# Patient Record
Sex: FEMALE | Race: WHITE | NOT HISPANIC OR LATINO | Employment: FULL TIME | URBAN - METROPOLITAN AREA
[De-identification: names, ages, dates, MRNs, and addresses within clinical notes are randomized per-mention and may not be internally consistent; named-entity substitution may affect disease eponyms.]

---

## 2017-01-24 ENCOUNTER — HISTORICAL (OUTPATIENT)
Dept: ADMINISTRATIVE | Facility: HOSPITAL | Age: 47
End: 2017-01-24

## 2019-03-11 ENCOUNTER — PATIENT MESSAGE (OUTPATIENT)
Dept: ADMINISTRATIVE | Facility: OTHER | Age: 49
End: 2019-03-11

## 2020-11-09 DIAGNOSIS — M25.562 LEFT KNEE PAIN, UNSPECIFIED CHRONICITY: Primary | ICD-10-CM

## 2021-05-06 ENCOUNTER — PATIENT MESSAGE (OUTPATIENT)
Dept: RESEARCH | Facility: HOSPITAL | Age: 51
End: 2021-05-06

## 2022-01-04 ENCOUNTER — TELEPHONE (OUTPATIENT)
Dept: FAMILY MEDICINE | Facility: CLINIC | Age: 52
End: 2022-01-04
Payer: COMMERCIAL

## 2022-01-31 ENCOUNTER — OFFICE VISIT (OUTPATIENT)
Dept: FAMILY MEDICINE | Facility: CLINIC | Age: 52
End: 2022-01-31
Payer: COMMERCIAL

## 2022-01-31 VITALS
HEART RATE: 95 BPM | HEIGHT: 67 IN | BODY MASS INDEX: 34.21 KG/M2 | DIASTOLIC BLOOD PRESSURE: 98 MMHG | SYSTOLIC BLOOD PRESSURE: 156 MMHG | WEIGHT: 218 LBS | OXYGEN SATURATION: 98 %

## 2022-01-31 DIAGNOSIS — F17.210 CIGARETTE NICOTINE DEPENDENCE WITHOUT COMPLICATION: ICD-10-CM

## 2022-01-31 DIAGNOSIS — Z13.0 SCREENING FOR DEFICIENCY ANEMIA: ICD-10-CM

## 2022-01-31 DIAGNOSIS — R03.0 ELEVATED BLOOD PRESSURE READING: ICD-10-CM

## 2022-01-31 DIAGNOSIS — Z01.419 WELL WOMAN EXAM WITH ROUTINE GYNECOLOGICAL EXAM: Primary | ICD-10-CM

## 2022-01-31 DIAGNOSIS — Z11.59 ENCOUNTER FOR HEPATITIS C SCREENING TEST FOR LOW RISK PATIENT: ICD-10-CM

## 2022-01-31 DIAGNOSIS — Z13.29 SCREENING FOR ENDOCRINE DISORDER: ICD-10-CM

## 2022-01-31 DIAGNOSIS — Z12.31 ENCOUNTER FOR SCREENING MAMMOGRAM FOR MALIGNANT NEOPLASM OF BREAST: ICD-10-CM

## 2022-01-31 DIAGNOSIS — Z13.6 SCREENING FOR ISCHEMIC HEART DISEASE (IHD): ICD-10-CM

## 2022-01-31 PROCEDURE — 3008F PR BODY MASS INDEX (BMI) DOCUMENTED: ICD-10-PCS | Mod: S$GLB,,, | Performed by: FAMILY MEDICINE

## 2022-01-31 PROCEDURE — 3077F SYST BP >= 140 MM HG: CPT | Mod: S$GLB,,, | Performed by: FAMILY MEDICINE

## 2022-01-31 PROCEDURE — 99396 PR PREVENTIVE VISIT,EST,40-64: ICD-10-PCS | Mod: S$GLB,,, | Performed by: FAMILY MEDICINE

## 2022-01-31 PROCEDURE — 3077F PR MOST RECENT SYSTOLIC BLOOD PRESSURE >= 140 MM HG: ICD-10-PCS | Mod: S$GLB,,, | Performed by: FAMILY MEDICINE

## 2022-01-31 PROCEDURE — 99396 PREV VISIT EST AGE 40-64: CPT | Mod: S$GLB,,, | Performed by: FAMILY MEDICINE

## 2022-01-31 PROCEDURE — 3080F PR MOST RECENT DIASTOLIC BLOOD PRESSURE >= 90 MM HG: ICD-10-PCS | Mod: S$GLB,,, | Performed by: FAMILY MEDICINE

## 2022-01-31 PROCEDURE — 3080F DIAST BP >= 90 MM HG: CPT | Mod: S$GLB,,, | Performed by: FAMILY MEDICINE

## 2022-01-31 PROCEDURE — 3008F BODY MASS INDEX DOCD: CPT | Mod: S$GLB,,, | Performed by: FAMILY MEDICINE

## 2022-01-31 RX ORDER — CRANBERRY FRUIT EXTRACT 650 MG
CAPSULE ORAL
COMMUNITY
End: 2022-09-08

## 2022-01-31 RX ORDER — VARENICLINE TARTRATE 1 MG/1
1 TABLET, FILM COATED ORAL 2 TIMES DAILY
Qty: 60 TABLET | Refills: 5 | Status: SHIPPED | OUTPATIENT
Start: 2022-01-31 | End: 2022-09-13

## 2022-01-31 NOTE — PROGRESS NOTES
SUBJECTIVE:   HPI: Nohelia Turk  is a 51 y.o. female who presents for annual physical .   Establish Care, physical to donate kidney, and requests rx for anxiety    Patient past medical history significant for nicotine addiction presents for annual physical.  Last well-woman visit was in 2019. She also completed her colonoscopy at that time.  Mammogram is due.  She does continue to smoke at least half pack a day and requests a prescription for Chantix to help with discontinuation.  She has plans to donate a kidney to her mother-in-law.  She is doing with it takes to make sure that she is adequately healthy enough for donation.  She has received her COVID-19 vaccination.  She has no previous surgical history.  She does have a family history of hypertension and heart disease.  The patient has a menopausal  but no history of abnormal Pap smear.    (Not in a hospital admission)    Review of patient's allergies indicates:  No Known Allergies  Current Outpatient Medications on File Prior to Visit   Medication Sig Dispense Refill    prasterone, dhea, 25 mg Cap Take by mouth.      [DISCONTINUED] FINACEA 15 % cream   5    [DISCONTINUED] JUBLIA 10 % Cherelle   3    [DISCONTINUED] MELATONIN ORAL Take 6 mg by mouth.      [DISCONTINUED] SUBOXONE 8-2 mg Film   0     No current facility-administered medications on file prior to visit.     History reviewed. No pertinent past medical history.  History reviewed. No pertinent surgical history.  Family History   Problem Relation Age of Onset    Hypertension Mother     Heart disease Mother 74        CHF    Osteoporosis Mother     Cancer Father 73        colon & prostate    Diabetes Father     Hypertension Father      Social History     Tobacco Use    Smoking status: Current Every Day Smoker     Packs/day: 0.50     Types: Cigarettes     Last attempt to quit: 2013     Years since quittin.0    Smokeless tobacco: Never Used   Substance Use Topics    Alcohol  "use: Yes     Comment: periodically at dinner    Drug use: Never      Health Maintenance Topics with due status: Not Due       Topic Last Completion Date    Colorectal Cancer Screening 05/06/2019    Lipid Panel 05/13/2019     Immunization History   Administered Date(s) Administered    COVID-19, vector-nr, rS-Ad26, PF (Dorina) 04/05/2021       Review of Systems   Constitutional: Negative for activity change, fatigue and unexpected weight change.   HENT: Negative for hearing loss, postnasal drip, sinus pressure, sore throat and voice change.    Eyes: Negative for photophobia and visual disturbance.   Respiratory: Negative for cough, shortness of breath and wheezing.    Cardiovascular: Negative for chest pain and palpitations.   Gastrointestinal: Negative for constipation, diarrhea and nausea.   Genitourinary: Negative for difficulty urinating, frequency, hematuria and urgency.   Musculoskeletal: Negative for arthralgias and back pain.   Skin: Negative for rash.   Neurological: Negative for weakness, light-headedness and headaches.   Hematological: Negative for adenopathy. Does not bruise/bleed easily.   Psychiatric/Behavioral: The patient is not nervous/anxious.       OBJECTIVE:      Vitals:    01/31/22 1512 01/31/22 1605   BP: (!) 132/104 (!) 156/98   Pulse: 95    SpO2: 98%    Weight: 98.9 kg (218 lb)    Height: 5' 6.5" (1.689 m)      Physical Exam  Vitals reviewed.   Constitutional:       General: She is not in acute distress.     Appearance: Normal appearance. She is well-developed.   HENT:      Head: Normocephalic and atraumatic.      Right Ear: External ear normal.      Left Ear: External ear normal.      Nose: Nose normal.      Mouth/Throat:      Mouth: Mucous membranes are moist.   Eyes:      General: Lids are normal.      Conjunctiva/sclera: Conjunctivae normal.      Pupils: Pupils are equal, round, and reactive to light.   Neck:      Thyroid: No thyromegaly.      Vascular: No JVD.      Trachea: No tracheal " deviation.   Cardiovascular:      Rate and Rhythm: Normal rate and regular rhythm.      Chest Wall: PMI is not displaced.      Pulses: Normal pulses.      Heart sounds: Normal heart sounds.   Pulmonary:      Effort: Pulmonary effort is normal.      Breath sounds: Normal breath sounds.   Abdominal:      General: Bowel sounds are normal.      Palpations: Abdomen is soft.      Tenderness: There is no abdominal tenderness. There is no guarding or rebound.   Genitourinary:     General: Normal vulva.      Vagina: Normal.      Cervix: Normal.      Uterus: Normal.       Adnexa: Right adnexa normal and left adnexa normal.      Rectum: Normal.   Musculoskeletal:         General: No tenderness. Normal range of motion.      Cervical back: Full passive range of motion without pain and neck supple.   Skin:     General: Skin is warm and dry.      Findings: No rash.   Neurological:      General: No focal deficit present.      Mental Status: She is alert and oriented to person, place, and time.   Psychiatric:         Mood and Affect: Mood normal.         Behavior: Behavior normal.        Assessment:       1. Well woman exam with routine gynecological exam    2. Cigarette nicotine dependence without complication    3. Elevated blood pressure reading    4. Encounter for screening mammogram for malignant neoplasm of breast    5. Encounter for hepatitis C screening test for low risk patient    6. Screening for deficiency anemia    7. Screening for endocrine disorder    8. Screening for ischemic heart disease (IHD)        Plan:       Well woman exam with routine gynecological exam  -     CBC Auto Differential; Future; Expected date: 01/31/2022  -     Lipid Panel; Future; Expected date: 01/31/2022  -     Microalbumin/Creatinine Ratio, Urine; Future; Expected date: 01/31/2022  -     Urinalysis; Future; Expected date: 01/31/2022  -     Comprehensive Metabolic Panel; Future; Expected date: 01/31/2022  -     TSH w/reflex to FT4; Future;  Expected date: 01/31/2022  -     ThinPrep Img Pap/HPV mRNA E6/E7 Ch, Gono  -     Hepatitis C Antibody; Future; Expected date: 01/31/2022    Cigarette nicotine dependence without complication  -     varenicline (CHANTIX) 1 mg Tab; Take 1 tablet (1 mg total) by mouth 2 (two) times daily.  Dispense: 60 tablet; Refill: 5    Elevated blood pressure reading    Encounter for screening mammogram for malignant neoplasm of breast  -     Mammo Digital Screening Bilat; Future; Expected date: 01/31/2022    Encounter for hepatitis C screening test for low risk patient  -     Hepatitis C Antibody; Future; Expected date: 01/31/2022    Screening for deficiency anemia  -     CBC Auto Differential; Future; Expected date: 01/31/2022    Screening for endocrine disorder  -     TSH w/reflex to FT4; Future; Expected date: 01/31/2022    Screening for ischemic heart disease (IHD)  -     Lipid Panel; Future; Expected date: 01/31/2022        Counseled on age and gender appropriate medical preventative services, including cancer screenings, immunizations, overall nutritional health, need for a consistent exercise regimen and an overall push towards maintaining a vigorous and active lifestyle.      Follow up in about 1 year (around 1/31/2023) for Annual Physical.

## 2022-02-02 LAB
ALBUMIN SERPL-MCNC: 4.3 G/DL (ref 3.6–5.1)
ALBUMIN/CREAT UR: 15 MCG/MG CREAT
ALBUMIN/GLOB SERPL: 1.5 (CALC) (ref 1–2.5)
ALP SERPL-CCNC: 69 U/L (ref 37–153)
ALT SERPL-CCNC: 18 U/L (ref 6–29)
APPEARANCE UR: CLEAR
AST SERPL-CCNC: 17 U/L (ref 10–35)
BASOPHILS # BLD AUTO: 69 CELLS/UL (ref 0–200)
BASOPHILS NFR BLD AUTO: 0.9 %
BILIRUB SERPL-MCNC: 0.5 MG/DL (ref 0.2–1.2)
BILIRUB UR QL STRIP: NEGATIVE
BUN SERPL-MCNC: 13 MG/DL (ref 7–25)
BUN/CREAT SERPL: ABNORMAL (CALC) (ref 6–22)
C TRACH RRNA SPEC QL NAA+PROBE: NOT DETECTED
CALCIUM SERPL-MCNC: 9.5 MG/DL (ref 8.6–10.4)
CHLORIDE SERPL-SCNC: 100 MMOL/L (ref 98–110)
CHOLEST SERPL-MCNC: 181 MG/DL
CHOLEST/HDLC SERPL: 3.9 (CALC)
CLINICAL INFO: NORMAL
CO2 SERPL-SCNC: 31 MMOL/L (ref 20–32)
COLOR UR: YELLOW
COMMENT: NORMAL
COMMENT: NORMAL
CREAT SERPL-MCNC: 0.8 MG/DL (ref 0.5–1.05)
CREAT UR-MCNC: 186 MG/DL (ref 20–275)
CYTO CVX: NORMAL
CYTOLOGIST CVX/VAG CYTO: NORMAL
CYTOLOGY CMNT CVX/VAG CYTO-IMP: NORMAL
DATE OF PREVIOUS PAP: NORMAL
DATE PREVIOUS BX: NORMAL
EOSINOPHIL # BLD AUTO: 100 CELLS/UL (ref 15–500)
EOSINOPHIL NFR BLD AUTO: 1.3 %
ERYTHROCYTE [DISTWIDTH] IN BLOOD BY AUTOMATED COUNT: 13 % (ref 11–15)
GLOBULIN SER CALC-MCNC: 2.8 G/DL (CALC) (ref 1.9–3.7)
GLUCOSE SERPL-MCNC: 100 MG/DL (ref 65–99)
GLUCOSE UR QL STRIP: NEGATIVE
HCT VFR BLD AUTO: 48.5 % (ref 35–45)
HCV AB S/CO SERPL IA: 0.03
HCV AB SERPL QL IA: NORMAL
HDLC SERPL-MCNC: 47 MG/DL
HGB BLD-MCNC: 15.6 G/DL (ref 11.7–15.5)
HGB UR QL STRIP: NEGATIVE
HPV E6+E7 MRNA CVX QL NAA+PROBE: NOT DETECTED
KETONES UR QL STRIP: NEGATIVE
LDLC SERPL CALC-MCNC: 111 MG/DL (CALC)
LEUKOCYTE ESTERASE UR QL STRIP: NEGATIVE
LMP START DATE: NORMAL
LYMPHOCYTES # BLD AUTO: 2356 CELLS/UL (ref 850–3900)
LYMPHOCYTES NFR BLD AUTO: 30.6 %
MCH RBC QN AUTO: 31.1 PG (ref 27–33)
MCHC RBC AUTO-ENTMCNC: 32.2 G/DL (ref 32–36)
MCV RBC AUTO: 96.8 FL (ref 80–100)
MICROALBUMIN UR-MCNC: 2.7 MG/DL
MONOCYTES # BLD AUTO: 939 CELLS/UL (ref 200–950)
MONOCYTES NFR BLD AUTO: 12.2 %
N GONORRHOEA RRNA SPEC QL NAA+PROBE: NOT DETECTED
NEUTROPHILS # BLD AUTO: 4235 CELLS/UL (ref 1500–7800)
NEUTROPHILS NFR BLD AUTO: 55 %
NITRITE UR QL STRIP: NEGATIVE
NONHDLC SERPL-MCNC: 134 MG/DL (CALC)
PH UR STRIP: 7 [PH] (ref 5–8)
PLATELET # BLD AUTO: 301 THOUSAND/UL (ref 140–400)
PMV BLD REES-ECKER: 11.2 FL (ref 7.5–12.5)
POTASSIUM SERPL-SCNC: 4.4 MMOL/L (ref 3.5–5.3)
PROT SERPL-MCNC: 7.1 G/DL (ref 6.1–8.1)
PROT UR QL STRIP: ABNORMAL
RBC # BLD AUTO: 5.01 MILLION/UL (ref 3.8–5.1)
SODIUM SERPL-SCNC: 139 MMOL/L (ref 135–146)
SP GR UR STRIP: 1.02 (ref 1–1.03)
SPECIMEN SOURCE CVX/VAG CYTO: NORMAL
STAT OF ADQ CVX/VAG CYTO-IMP: NORMAL
TRIGL SERPL-MCNC: 124 MG/DL
TSH SERPL-ACNC: 1.97 MIU/L
WBC # BLD AUTO: 7.7 THOUSAND/UL (ref 3.8–10.8)

## 2022-02-16 ENCOUNTER — HOSPITAL ENCOUNTER (OUTPATIENT)
Dept: RADIOLOGY | Facility: HOSPITAL | Age: 52
Discharge: HOME | End: 2022-02-16
Attending: FAMILY MEDICINE
Payer: COMMERCIAL

## 2022-02-16 VITALS — HEIGHT: 67 IN | WEIGHT: 218.06 LBS | BODY MASS INDEX: 34.22 KG/M2

## 2022-02-16 DIAGNOSIS — Z12.31 ENCOUNTER FOR SCREENING MAMMOGRAM FOR MALIGNANT NEOPLASM OF BREAST: ICD-10-CM

## 2022-02-16 PROCEDURE — 77063 BREAST TOMOSYNTHESIS BI: CPT | Mod: TC,PO

## 2022-03-05 ENCOUNTER — PATIENT MESSAGE (OUTPATIENT)
Dept: FAMILY MEDICINE | Facility: CLINIC | Age: 52
End: 2022-03-05
Payer: COMMERCIAL

## 2022-03-07 RX ORDER — EFINACONAZOLE 100 MG/ML
SOLUTION TOPICAL
Refills: 3 | Status: CANCELLED | OUTPATIENT
Start: 2022-03-07

## 2022-06-20 ENCOUNTER — OFFICE VISIT (OUTPATIENT)
Dept: FAMILY MEDICINE | Facility: CLINIC | Age: 52
End: 2022-06-20
Payer: COMMERCIAL

## 2022-06-20 VITALS
HEART RATE: 88 BPM | WEIGHT: 214 LBS | SYSTOLIC BLOOD PRESSURE: 162 MMHG | OXYGEN SATURATION: 96 % | DIASTOLIC BLOOD PRESSURE: 102 MMHG | HEIGHT: 67 IN | BODY MASS INDEX: 33.59 KG/M2

## 2022-06-20 DIAGNOSIS — F17.210 CIGARETTE NICOTINE DEPENDENCE WITHOUT COMPLICATION: ICD-10-CM

## 2022-06-20 DIAGNOSIS — M70.72 BURSITIS OF OTHER BURSA OF LEFT HIP: ICD-10-CM

## 2022-06-20 DIAGNOSIS — N95.1 MENOPAUSAL SYMPTOMS: Primary | ICD-10-CM

## 2022-06-20 DIAGNOSIS — F43.21 SITUATIONAL DEPRESSION: ICD-10-CM

## 2022-06-20 DIAGNOSIS — R03.0 TRANSIENT ELEVATED BLOOD PRESSURE: ICD-10-CM

## 2022-06-20 PROCEDURE — 3077F SYST BP >= 140 MM HG: CPT | Mod: CPTII,S$GLB,, | Performed by: FAMILY MEDICINE

## 2022-06-20 PROCEDURE — 3008F BODY MASS INDEX DOCD: CPT | Mod: CPTII,S$GLB,, | Performed by: FAMILY MEDICINE

## 2022-06-20 PROCEDURE — 3080F PR MOST RECENT DIASTOLIC BLOOD PRESSURE >= 90 MM HG: ICD-10-PCS | Mod: CPTII,S$GLB,, | Performed by: FAMILY MEDICINE

## 2022-06-20 PROCEDURE — 3080F DIAST BP >= 90 MM HG: CPT | Mod: CPTII,S$GLB,, | Performed by: FAMILY MEDICINE

## 2022-06-20 PROCEDURE — 3077F PR MOST RECENT SYSTOLIC BLOOD PRESSURE >= 140 MM HG: ICD-10-PCS | Mod: CPTII,S$GLB,, | Performed by: FAMILY MEDICINE

## 2022-06-20 PROCEDURE — 3008F PR BODY MASS INDEX (BMI) DOCUMENTED: ICD-10-PCS | Mod: CPTII,S$GLB,, | Performed by: FAMILY MEDICINE

## 2022-06-20 PROCEDURE — 99214 OFFICE O/P EST MOD 30 MIN: CPT | Mod: S$GLB,,, | Performed by: FAMILY MEDICINE

## 2022-06-20 PROCEDURE — 99214 PR OFFICE/OUTPT VISIT, EST, LEVL IV, 30-39 MIN: ICD-10-PCS | Mod: S$GLB,,, | Performed by: FAMILY MEDICINE

## 2022-06-20 RX ORDER — CLONIDINE HYDROCHLORIDE 0.2 MG/1
0.2 TABLET ORAL NIGHTLY
Qty: 90 TABLET | Refills: 1 | Status: SHIPPED | OUTPATIENT
Start: 2022-06-20 | End: 2022-09-07 | Stop reason: SDUPTHER

## 2022-06-20 RX ORDER — VENLAFAXINE HYDROCHLORIDE 75 MG/1
75 CAPSULE, EXTENDED RELEASE ORAL DAILY
Qty: 90 CAPSULE | Refills: 1 | Status: SHIPPED | OUTPATIENT
Start: 2022-06-20 | End: 2022-09-07 | Stop reason: SDUPTHER

## 2022-06-20 NOTE — PROGRESS NOTES
SUBJECTIVE:    Patient ID: Nohelia Turk is a 52 y.o. female.    Chief Complaint: Hip Pain (Left, had a fall x1 mth comes and goes, hot flashes, went over meds verbally// SW)    Patient reports a trip and fall onto her left hip. Has intermittent pain when walking at times and also notes pain when she crosses her legs too long.   Patient has been working on smoking cessation but the stress in her life has been keeping her from completing to quit .   Blood pressure is also noted to be elevated. She attributes this to her stress as well    Notes increase in hot flashes as well and having issues with controlling her weight         History reviewed. No pertinent past medical history.  History reviewed. No pertinent surgical history.  Family History   Problem Relation Age of Onset    Hypertension Mother     Heart disease Mother 74        CHF    Osteoporosis Mother     Cancer Father 73        colon & prostate    Diabetes Father     Hypertension Father        Marital Status:   Alcohol History:  reports current alcohol use.  Tobacco History:  reports that she has been smoking cigarettes. She has been smoking about 0.50 packs per day. She has never used smokeless tobacco.  Drug History:  reports no history of drug use.    Review of patient's allergies indicates:  No Known Allergies    Current Outpatient Medications:     efinaconazole (JUBLIA) 10 % Cherelle, Apply 1 application topically once daily., Disp: 8 mL, Rfl: 1    prasterone, dhea, 25 mg Cap, Take by mouth., Disp: , Rfl:     varenicline (CHANTIX) 1 mg Tab, Take 1 tablet (1 mg total) by mouth 2 (two) times daily., Disp: 60 tablet, Rfl: 5    cloNIDine (CATAPRES) 0.2 MG tablet, Take 1 tablet (0.2 mg total) by mouth nightly. For hot flashes, Disp: 90 tablet, Rfl: 1    venlafaxine (EFFEXOR-XR) 75 MG 24 hr capsule, Take 1 capsule (75 mg total) by mouth once daily., Disp: 90 capsule, Rfl: 1    Review of Systems   All other systems reviewed and are negative.    "    Objective:      Vitals:    06/20/22 1557 06/20/22 1559   BP: (!) 160/100 (!) 162/102   Pulse: 88    SpO2: 96%    Weight: 97.1 kg (214 lb)    Height: 5' 6.5" (1.689 m)      Physical Exam  Constitutional:       Appearance: Normal appearance.   HENT:      Head: Normocephalic and atraumatic.      Mouth/Throat:      Mouth: Mucous membranes are moist.   Eyes:      Conjunctiva/sclera: Conjunctivae normal.   Pulmonary:      Effort: Pulmonary effort is normal.   Neurological:      General: No focal deficit present.      Mental Status: She is alert and oriented to person, place, and time.   Psychiatric:         Mood and Affect: Mood normal.         Behavior: Behavior normal.         Assessment:       1. Menopausal symptoms    2. Situational depression    3. Cigarette nicotine dependence without complication    4. Transient elevated blood pressure    5. Bursitis of other bursa of left hip         Plan:       Menopausal symptoms  -     venlafaxine (EFFEXOR-XR) 75 MG 24 hr capsule; Take 1 capsule (75 mg total) by mouth once daily.  Dispense: 90 capsule; Refill: 1  -     cloNIDine (CATAPRES) 0.2 MG tablet; Take 1 tablet (0.2 mg total) by mouth nightly. For hot flashes  Dispense: 90 tablet; Refill: 1    Situational depression  -     venlafaxine (EFFEXOR-XR) 75 MG 24 hr capsule; Take 1 capsule (75 mg total) by mouth once daily.  Dispense: 90 capsule; Refill: 1    Cigarette nicotine dependence without complication    Transient elevated blood pressure  -     cloNIDine (CATAPRES) 0.2 MG tablet; Take 1 tablet (0.2 mg total) by mouth nightly. For hot flashes  Dispense: 90 tablet; Refill: 1    Bursitis of other bursa of left hip      Follow up 2-3 months, for blood pressure , hot flashes .              "

## 2022-06-22 ENCOUNTER — PATIENT MESSAGE (OUTPATIENT)
Dept: FAMILY MEDICINE | Facility: CLINIC | Age: 52
End: 2022-06-22

## 2022-06-23 RX ORDER — IBUPROFEN 600 MG/1
600 TABLET ORAL 3 TIMES DAILY
Qty: 60 TABLET | Refills: 0 | Status: SHIPPED | OUTPATIENT
Start: 2022-06-23 | End: 2022-11-21

## 2022-08-19 ENCOUNTER — PATIENT MESSAGE (OUTPATIENT)
Dept: FAMILY MEDICINE | Facility: CLINIC | Age: 52
End: 2022-08-19

## 2022-08-24 ENCOUNTER — PATIENT MESSAGE (OUTPATIENT)
Dept: FAMILY MEDICINE | Facility: CLINIC | Age: 52
End: 2022-08-24

## 2022-08-31 DIAGNOSIS — F43.21 SITUATIONAL DEPRESSION: ICD-10-CM

## 2022-08-31 DIAGNOSIS — R03.0 TRANSIENT ELEVATED BLOOD PRESSURE: ICD-10-CM

## 2022-08-31 DIAGNOSIS — N95.1 MENOPAUSAL SYMPTOMS: ICD-10-CM

## 2022-09-08 RX ORDER — VENLAFAXINE HYDROCHLORIDE 75 MG/1
CAPSULE, EXTENDED RELEASE ORAL
Refills: 0 | OUTPATIENT
Start: 2022-09-08

## 2022-09-08 RX ORDER — CLONIDINE HYDROCHLORIDE 0.2 MG/1
TABLET ORAL
Refills: 0 | OUTPATIENT
Start: 2022-09-08

## 2022-09-13 ENCOUNTER — OFFICE VISIT (OUTPATIENT)
Dept: FAMILY MEDICINE | Facility: CLINIC | Age: 52
End: 2022-09-13
Payer: COMMERCIAL

## 2022-09-13 ENCOUNTER — HOSPITAL ENCOUNTER (OUTPATIENT)
Dept: RADIOLOGY | Facility: HOSPITAL | Age: 52
Discharge: HOME | End: 2022-09-13
Attending: FAMILY MEDICINE
Payer: COMMERCIAL

## 2022-09-13 VITALS
BODY MASS INDEX: 33.59 KG/M2 | HEIGHT: 67 IN | DIASTOLIC BLOOD PRESSURE: 110 MMHG | SYSTOLIC BLOOD PRESSURE: 172 MMHG | WEIGHT: 214 LBS | HEART RATE: 110 BPM

## 2022-09-13 DIAGNOSIS — M70.72 BURSITIS OF OTHER BURSA OF LEFT HIP: ICD-10-CM

## 2022-09-13 DIAGNOSIS — F43.21 SITUATIONAL DEPRESSION: ICD-10-CM

## 2022-09-13 DIAGNOSIS — I10 ESSENTIAL HYPERTENSION: Primary | ICD-10-CM

## 2022-09-13 DIAGNOSIS — N95.1 MENOPAUSAL SYMPTOMS: ICD-10-CM

## 2022-09-13 PROCEDURE — 3061F PR NEG MICROALBUMINURIA RESULT DOCUMENTED/REVIEW: ICD-10-PCS | Mod: CPTII,S$GLB,, | Performed by: FAMILY MEDICINE

## 2022-09-13 PROCEDURE — 1159F MED LIST DOCD IN RCRD: CPT | Mod: CPTII,S$GLB,, | Performed by: FAMILY MEDICINE

## 2022-09-13 PROCEDURE — 3080F DIAST BP >= 90 MM HG: CPT | Mod: CPTII,S$GLB,, | Performed by: FAMILY MEDICINE

## 2022-09-13 PROCEDURE — 3077F PR MOST RECENT SYSTOLIC BLOOD PRESSURE >= 140 MM HG: ICD-10-PCS | Mod: CPTII,S$GLB,, | Performed by: FAMILY MEDICINE

## 2022-09-13 PROCEDURE — 1160F PR REVIEW ALL MEDS BY PRESCRIBER/CLIN PHARMACIST DOCUMENTED: ICD-10-PCS | Mod: CPTII,S$GLB,, | Performed by: FAMILY MEDICINE

## 2022-09-13 PROCEDURE — 73502 X-RAY EXAM HIP UNI 2-3 VIEWS: CPT | Mod: TC,PO,LT

## 2022-09-13 PROCEDURE — 3066F PR DOCUMENTATION OF TREATMENT FOR NEPHROPATHY: ICD-10-PCS | Mod: CPTII,S$GLB,, | Performed by: FAMILY MEDICINE

## 2022-09-13 PROCEDURE — 99214 PR OFFICE/OUTPT VISIT, EST, LEVL IV, 30-39 MIN: ICD-10-PCS | Mod: S$GLB,,, | Performed by: FAMILY MEDICINE

## 2022-09-13 PROCEDURE — 3066F NEPHROPATHY DOC TX: CPT | Mod: CPTII,S$GLB,, | Performed by: FAMILY MEDICINE

## 2022-09-13 PROCEDURE — 1160F RVW MEDS BY RX/DR IN RCRD: CPT | Mod: CPTII,S$GLB,, | Performed by: FAMILY MEDICINE

## 2022-09-13 PROCEDURE — 1159F PR MEDICATION LIST DOCUMENTED IN MEDICAL RECORD: ICD-10-PCS | Mod: CPTII,S$GLB,, | Performed by: FAMILY MEDICINE

## 2022-09-13 PROCEDURE — 3077F SYST BP >= 140 MM HG: CPT | Mod: CPTII,S$GLB,, | Performed by: FAMILY MEDICINE

## 2022-09-13 PROCEDURE — 3061F NEG MICROALBUMINURIA REV: CPT | Mod: CPTII,S$GLB,, | Performed by: FAMILY MEDICINE

## 2022-09-13 PROCEDURE — 3080F PR MOST RECENT DIASTOLIC BLOOD PRESSURE >= 90 MM HG: ICD-10-PCS | Mod: CPTII,S$GLB,, | Performed by: FAMILY MEDICINE

## 2022-09-13 PROCEDURE — 99214 OFFICE O/P EST MOD 30 MIN: CPT | Mod: S$GLB,,, | Performed by: FAMILY MEDICINE

## 2022-09-13 RX ORDER — OLMESARTAN MEDOXOMIL AND HYDROCHLOROTHIAZIDE 20/12.5 20; 12.5 MG/1; MG/1
1 TABLET ORAL DAILY
Qty: 90 TABLET | Refills: 1 | Status: SHIPPED | OUTPATIENT
Start: 2022-09-13 | End: 2022-11-21

## 2022-09-13 NOTE — PROGRESS NOTES
SUBJECTIVE:    Patient ID: Nohelia Turk is a 52 y.o. female.    Chief Complaint: Hypertension (Did not bring bottles//)    Patient hypertension presents for her follow-up.  Effexor has helped with her mood and her hot flashes.      Still with some occasional hip pain with certain movements. Nsaids helped     Blood pressure is still significantly elevated despite daily therapy.  She does report a some increased stress.  She denies headache.  .    History reviewed. No pertinent past medical history.  History reviewed. No pertinent surgical history.  Family History   Problem Relation Age of Onset    Hypertension Mother     Heart disease Mother 74        CHF    Osteoporosis Mother     Cancer Father 73        colon & prostate    Diabetes Father     Hypertension Father        Marital Status:   Alcohol History:  reports current alcohol use.  Tobacco History:  reports that she has been smoking cigarettes. She has been smoking an average of .5 packs per day. She has never used smokeless tobacco.  Drug History:  reports no history of drug use.    Review of patient's allergies indicates:  No Known Allergies    Current Outpatient Medications:     cloNIDine (CATAPRES) 0.2 MG tablet, Take 1 tablet (0.2 mg total) by mouth nightly. For hot flashes, Disp: 90 tablet, Rfl: 1    ibuprofen (ADVIL,MOTRIN) 600 MG tablet, Take 1 tablet (600 mg total) by mouth 3 (three) times daily., Disp: 60 tablet, Rfl: 0    venlafaxine (EFFEXOR-XR) 75 MG 24 hr capsule, Take 1 capsule (75 mg total) by mouth once daily., Disp: 90 capsule, Rfl: 1    olmesartan-hydrochlorothiazide (BENICAR HCT) 20-12.5 mg per tablet, Take 1 tablet by mouth once daily., Disp: 90 tablet, Rfl: 1    Review of Systems   Constitutional:  Negative for activity change, fatigue and unexpected weight change.   HENT:  Negative for hearing loss, postnasal drip, sinus pressure, sore throat and voice change.    Eyes:  Negative for photophobia and visual disturbance.  "  Respiratory:  Negative for cough, shortness of breath and wheezing.    Cardiovascular:  Negative for chest pain and palpitations.   Gastrointestinal:  Negative for constipation, diarrhea and nausea.   Genitourinary:  Negative for difficulty urinating, frequency, hematuria and urgency.   Musculoskeletal:  Negative for arthralgias and back pain.   Skin:  Negative for rash.   Neurological:  Negative for weakness, light-headedness and headaches.   Hematological:  Negative for adenopathy. Does not bruise/bleed easily.   Psychiatric/Behavioral:  The patient is not nervous/anxious.         Objective:      Vitals:    09/13/22 1424 09/13/22 1428   BP: (!) 178/118 (!) 172/110   Pulse: 110    Weight: 97.1 kg (214 lb)    Height: 5' 6.5" (1.689 m)      Physical Exam  Constitutional:       Appearance: Normal appearance.   HENT:      Head: Normocephalic and atraumatic.      Mouth/Throat:      Mouth: Mucous membranes are moist.   Eyes:      Conjunctiva/sclera: Conjunctivae normal.   Pulmonary:      Effort: Pulmonary effort is normal.   Neurological:      General: No focal deficit present.      Mental Status: She is alert and oriented to person, place, and time.   Psychiatric:         Mood and Affect: Mood normal.         Behavior: Behavior normal.         Assessment:       1. Essential hypertension    2. Menopausal symptoms    3. Situational depression    4. Bursitis of other bursa of left hip         Plan:       Essential hypertension  -     olmesartan-hydrochlorothiazide (BENICAR HCT) 20-12.5 mg per tablet; Take 1 tablet by mouth once daily.  Dispense: 90 tablet; Refill: 1    Menopausal symptoms    Situational depression    Bursitis of other bursa of left hip  -     X-Ray Hip 2 or 3 views Left (with Pelvis when performed); Future; Expected date: 09/13/2022    Follow up in about 8 weeks (around 11/8/2022) for HTN.                "

## 2022-09-15 ENCOUNTER — PATIENT MESSAGE (OUTPATIENT)
Dept: FAMILY MEDICINE | Facility: CLINIC | Age: 52
End: 2022-09-15

## 2022-09-16 ENCOUNTER — PATIENT MESSAGE (OUTPATIENT)
Dept: FAMILY MEDICINE | Facility: CLINIC | Age: 52
End: 2022-09-16

## 2022-09-19 ENCOUNTER — PATIENT MESSAGE (OUTPATIENT)
Dept: FAMILY MEDICINE | Facility: CLINIC | Age: 52
End: 2022-09-19

## 2022-09-19 RX ORDER — SEMAGLUTIDE 1.34 MG/ML
0.5 INJECTION, SOLUTION SUBCUTANEOUS
Qty: 1 PEN | Refills: 1 | Status: SHIPPED | OUTPATIENT
Start: 2022-09-19 | End: 2022-10-20

## 2022-09-28 ENCOUNTER — PATIENT MESSAGE (OUTPATIENT)
Dept: FAMILY MEDICINE | Facility: CLINIC | Age: 52
End: 2022-09-28

## 2022-10-11 ENCOUNTER — PATIENT MESSAGE (OUTPATIENT)
Dept: FAMILY MEDICINE | Facility: CLINIC | Age: 52
End: 2022-10-11

## 2022-10-20 RX ORDER — SEMAGLUTIDE 0.25 MG/.5ML
0.25 INJECTION, SOLUTION SUBCUTANEOUS
Qty: 2 ML | Refills: 1 | Status: SHIPPED | OUTPATIENT
Start: 2022-10-20 | End: 2022-11-21

## 2022-10-24 ENCOUNTER — PATIENT MESSAGE (OUTPATIENT)
Dept: FAMILY MEDICINE | Facility: CLINIC | Age: 52
End: 2022-10-24

## 2022-10-26 ENCOUNTER — TELEPHONE (OUTPATIENT)
Dept: FAMILY MEDICINE | Facility: CLINIC | Age: 52
End: 2022-10-26

## 2022-10-26 NOTE — TELEPHONE ENCOUNTER
----- Message from Natasha Rodriguez sent at 10/26/2022 11:48 AM CDT -----  PA for Wegovy has been approved for 9/25/22 - 5/23/23. Notified pt. Thank you

## 2022-11-21 ENCOUNTER — OFFICE VISIT (OUTPATIENT)
Dept: FAMILY MEDICINE | Facility: CLINIC | Age: 52
End: 2022-11-21
Payer: COMMERCIAL

## 2022-11-21 VITALS
DIASTOLIC BLOOD PRESSURE: 100 MMHG | BODY MASS INDEX: 33.27 KG/M2 | WEIGHT: 212 LBS | SYSTOLIC BLOOD PRESSURE: 138 MMHG | OXYGEN SATURATION: 99 % | HEART RATE: 110 BPM | HEIGHT: 67 IN

## 2022-11-21 DIAGNOSIS — Z13.6 SCREENING FOR ISCHEMIC HEART DISEASE (IHD): ICD-10-CM

## 2022-11-21 DIAGNOSIS — I10 ESSENTIAL HYPERTENSION: Primary | ICD-10-CM

## 2022-11-21 DIAGNOSIS — F17.210 CIGARETTE NICOTINE DEPENDENCE WITHOUT COMPLICATION: ICD-10-CM

## 2022-11-21 DIAGNOSIS — R79.89 ABNORMAL CBC: ICD-10-CM

## 2022-11-21 DIAGNOSIS — E66.9 CLASS 1 OBESITY WITH SERIOUS COMORBIDITY AND BODY MASS INDEX (BMI) OF 33.0 TO 33.9 IN ADULT, UNSPECIFIED OBESITY TYPE: ICD-10-CM

## 2022-11-21 PROCEDURE — 1159F PR MEDICATION LIST DOCUMENTED IN MEDICAL RECORD: ICD-10-PCS | Mod: CPTII,S$GLB,, | Performed by: FAMILY MEDICINE

## 2022-11-21 PROCEDURE — 1159F MED LIST DOCD IN RCRD: CPT | Mod: CPTII,S$GLB,, | Performed by: FAMILY MEDICINE

## 2022-11-21 PROCEDURE — 99214 PR OFFICE/OUTPT VISIT, EST, LEVL IV, 30-39 MIN: ICD-10-PCS | Mod: S$GLB,,, | Performed by: FAMILY MEDICINE

## 2022-11-21 PROCEDURE — 3080F PR MOST RECENT DIASTOLIC BLOOD PRESSURE >= 90 MM HG: ICD-10-PCS | Mod: CPTII,S$GLB,, | Performed by: FAMILY MEDICINE

## 2022-11-21 PROCEDURE — 3066F NEPHROPATHY DOC TX: CPT | Mod: CPTII,S$GLB,, | Performed by: FAMILY MEDICINE

## 2022-11-21 PROCEDURE — 1160F PR REVIEW ALL MEDS BY PRESCRIBER/CLIN PHARMACIST DOCUMENTED: ICD-10-PCS | Mod: CPTII,S$GLB,, | Performed by: FAMILY MEDICINE

## 2022-11-21 PROCEDURE — 1160F RVW MEDS BY RX/DR IN RCRD: CPT | Mod: CPTII,S$GLB,, | Performed by: FAMILY MEDICINE

## 2022-11-21 PROCEDURE — 3008F BODY MASS INDEX DOCD: CPT | Mod: CPTII,S$GLB,, | Performed by: FAMILY MEDICINE

## 2022-11-21 PROCEDURE — 3061F NEG MICROALBUMINURIA REV: CPT | Mod: CPTII,S$GLB,, | Performed by: FAMILY MEDICINE

## 2022-11-21 PROCEDURE — 3008F PR BODY MASS INDEX (BMI) DOCUMENTED: ICD-10-PCS | Mod: CPTII,S$GLB,, | Performed by: FAMILY MEDICINE

## 2022-11-21 PROCEDURE — 3061F PR NEG MICROALBUMINURIA RESULT DOCUMENTED/REVIEW: ICD-10-PCS | Mod: CPTII,S$GLB,, | Performed by: FAMILY MEDICINE

## 2022-11-21 PROCEDURE — 3075F SYST BP GE 130 - 139MM HG: CPT | Mod: CPTII,S$GLB,, | Performed by: FAMILY MEDICINE

## 2022-11-21 PROCEDURE — 3075F PR MOST RECENT SYSTOLIC BLOOD PRESS GE 130-139MM HG: ICD-10-PCS | Mod: CPTII,S$GLB,, | Performed by: FAMILY MEDICINE

## 2022-11-21 PROCEDURE — 3066F PR DOCUMENTATION OF TREATMENT FOR NEPHROPATHY: ICD-10-PCS | Mod: CPTII,S$GLB,, | Performed by: FAMILY MEDICINE

## 2022-11-21 PROCEDURE — 99214 OFFICE O/P EST MOD 30 MIN: CPT | Mod: S$GLB,,, | Performed by: FAMILY MEDICINE

## 2022-11-21 PROCEDURE — 3080F DIAST BP >= 90 MM HG: CPT | Mod: CPTII,S$GLB,, | Performed by: FAMILY MEDICINE

## 2022-11-21 RX ORDER — AMLODIPINE AND OLMESARTAN MEDOXOMIL 5; 20 MG/1; MG/1
1 TABLET ORAL DAILY
Qty: 90 TABLET | Refills: 3 | Status: SHIPPED | OUTPATIENT
Start: 2022-11-21 | End: 2023-02-16

## 2022-11-21 RX ORDER — SEMAGLUTIDE 0.5 MG/.5ML
0.5 INJECTION, SOLUTION SUBCUTANEOUS
Qty: 2 ML | Refills: 1 | Status: SHIPPED | OUTPATIENT
Start: 2022-11-21 | End: 2023-01-31 | Stop reason: SDUPTHER

## 2022-11-21 NOTE — PROGRESS NOTES
SUBJECTIVE:    Patient ID: Nohelia Turk is a 52 y.o. female.    Chief Complaint: 2M HTN Check Up, wegovy refill, and declined flu vaccination    Patient with newly diagnosed hypertension presents for visit.  She was on Benicar HCTZ and has noted that her blood pressures generally run 140s over 100 similar to today.  Has improved however from previous values.  She is requesting a change of medication however because hydrochlorothiazide is causing significantly dry mouth.    Patient has also been started on wegovy for obesity.  She feels that she is tolerating the low is does well.  Has had 2 lb weight loss and would like to increase to the next dose.    Patient does continue to smoke.  Notably last CBC showed some pancytopenia most likely due to tobacco use.  Recheck as needed.      History reviewed. No pertinent past medical history.  History reviewed. No pertinent surgical history.  Family History   Problem Relation Age of Onset    Hypertension Mother     Heart disease Mother 74        CHF    Osteoporosis Mother     Cancer Father 73        colon & prostate    Diabetes Father     Hypertension Father        Marital Status:   Alcohol History:  reports current alcohol use.  Tobacco History:  reports that she has been smoking cigarettes. She has been smoking an average of .5 packs per day. She has never used smokeless tobacco.  Drug History:  reports no history of drug use.    Review of patient's allergies indicates:  No Known Allergies    Current Outpatient Medications:     cloNIDine (CATAPRES) 0.2 MG tablet, Take 1 tablet (0.2 mg total) by mouth nightly. For hot flashes, Disp: 90 tablet, Rfl: 1    venlafaxine (EFFEXOR-XR) 75 MG 24 hr capsule, Take 1 capsule (75 mg total) by mouth once daily., Disp: 90 capsule, Rfl: 1    amlodipine-olmesartan (JONATHAN) 5-20 mg per tablet, Take 1 tablet by mouth once daily., Disp: 90 tablet, Rfl: 3    semaglutide, weight loss, (WEGOVY) 0.5 mg/0.5 mL PnIj, Inject 0.5 mg into  "the skin every 7 days., Disp: 2 mL, Rfl: 1    Review of Systems   Constitutional:  Negative for activity change, fatigue and unexpected weight change.   HENT:  Negative for hearing loss, postnasal drip, sinus pressure, sore throat and voice change.    Eyes:  Negative for photophobia and visual disturbance.   Respiratory:  Negative for cough, shortness of breath and wheezing.    Cardiovascular:  Negative for chest pain and palpitations.   Gastrointestinal:  Positive for nausea. Negative for constipation and diarrhea.   Genitourinary:  Negative for difficulty urinating, frequency, hematuria and urgency.   Musculoskeletal:  Negative for arthralgias and back pain.   Skin:  Negative for rash.   Neurological:  Negative for weakness, light-headedness and headaches.   Hematological:  Negative for adenopathy. Does not bruise/bleed easily.   Psychiatric/Behavioral:  The patient is not nervous/anxious.    All other systems reviewed and are negative.       Objective:      Vitals:    11/21/22 1506 11/21/22 1530   BP: (!) 144/108 (!) 138/100   Pulse: 110    SpO2: 99%    Weight: 96.2 kg (212 lb)    Height: 5' 6.5" (1.689 m)      Physical Exam  Constitutional:       Appearance: Normal appearance.   HENT:      Head: Normocephalic and atraumatic.      Mouth/Throat:      Mouth: Mucous membranes are moist.   Eyes:      Conjunctiva/sclera: Conjunctivae normal.   Pulmonary:      Effort: Pulmonary effort is normal.   Neurological:      General: No focal deficit present.      Mental Status: She is alert and oriented to person, place, and time.   Psychiatric:         Mood and Affect: Mood normal.         Behavior: Behavior normal.         Assessment:       1. Essential hypertension    2. Class 1 obesity with serious comorbidity and body mass index (BMI) of 33.0 to 33.9 in adult, unspecified obesity type    3. Cigarette nicotine dependence without complication    4. Abnormal CBC    5. Screening for ischemic heart disease (IHD)         Plan: "       Essential hypertension  -     amlodipine-olmesartan (JONATHAN) 5-20 mg per tablet; Take 1 tablet by mouth once daily.  Dispense: 90 tablet; Refill: 3  -     Microalbumin/Creatinine Ratio, Urine; Future; Expected date: 01/25/2023  -     Comprehensive Metabolic Panel; Future; Expected date: 01/25/2023    Class 1 obesity with serious comorbidity and body mass index (BMI) of 33.0 to 33.9 in adult, unspecified obesity type  -     semaglutide, weight loss, (WEGOVY) 0.5 mg/0.5 mL PnIj; Inject 0.5 mg into the skin every 7 days.  Dispense: 2 mL; Refill: 1    Cigarette nicotine dependence without complication    Abnormal CBC  -     CBC Auto Differential; Future; Expected date: 01/25/2023    Screening for ischemic heart disease (IHD)  -     Lipid Panel; Future; Expected date: 01/25/2023    Follow up in about 4 months (around 3/21/2023).

## 2023-01-19 ENCOUNTER — TELEPHONE (OUTPATIENT)
Dept: FAMILY MEDICINE | Facility: CLINIC | Age: 53
End: 2023-01-19

## 2023-01-24 ENCOUNTER — TELEPHONE (OUTPATIENT)
Dept: FAMILY MEDICINE | Facility: CLINIC | Age: 53
End: 2023-01-24

## 2023-01-24 NOTE — TELEPHONE ENCOUNTER
----- Message from Tu Hills MD sent at 11/21/2022  3:39 PM CST -----  Remind patient to get labs prior to upcoming appointment

## 2023-01-24 NOTE — TELEPHONE ENCOUNTER
Left message to notify to complete fasting labs one week prior to visit.  Informed to return call with questions/concerns -DN

## 2023-01-26 LAB
ALBUMIN SERPL-MCNC: 4.2 G/DL (ref 3.6–5.1)
ALBUMIN/CREAT UR: 11 MCG/MG CREAT
ALBUMIN/GLOB SERPL: 1.4 (CALC) (ref 1–2.5)
ALP SERPL-CCNC: 86 U/L (ref 37–153)
ALT SERPL-CCNC: 25 U/L (ref 6–29)
AST SERPL-CCNC: 21 U/L (ref 10–35)
BASOPHILS # BLD AUTO: 72 CELLS/UL (ref 0–200)
BASOPHILS NFR BLD AUTO: 0.8 %
BILIRUB SERPL-MCNC: 0.5 MG/DL (ref 0.2–1.2)
BUN SERPL-MCNC: 17 MG/DL (ref 7–25)
BUN/CREAT SERPL: ABNORMAL (CALC) (ref 6–22)
CALCIUM SERPL-MCNC: 9 MG/DL (ref 8.6–10.4)
CHLORIDE SERPL-SCNC: 99 MMOL/L (ref 98–110)
CHOLEST SERPL-MCNC: 213 MG/DL
CHOLEST/HDLC SERPL: 4 (CALC)
CO2 SERPL-SCNC: 35 MMOL/L (ref 20–32)
CREAT SERPL-MCNC: 0.78 MG/DL (ref 0.5–1.03)
CREAT UR-MCNC: 158 MG/DL (ref 20–275)
EGFR: 90 ML/MIN/1.73M2
EOSINOPHIL # BLD AUTO: 144 CELLS/UL (ref 15–500)
EOSINOPHIL NFR BLD AUTO: 1.6 %
ERYTHROCYTE [DISTWIDTH] IN BLOOD BY AUTOMATED COUNT: 13.2 % (ref 11–15)
GLOBULIN SER CALC-MCNC: 2.9 G/DL (CALC) (ref 1.9–3.7)
GLUCOSE SERPL-MCNC: 116 MG/DL (ref 65–99)
HCT VFR BLD AUTO: 44.7 % (ref 35–45)
HDLC SERPL-MCNC: 53 MG/DL
HGB BLD-MCNC: 15.2 G/DL (ref 11.7–15.5)
LDLC SERPL CALC-MCNC: 138 MG/DL (CALC)
LYMPHOCYTES # BLD AUTO: 2619 CELLS/UL (ref 850–3900)
LYMPHOCYTES NFR BLD AUTO: 29.1 %
MCH RBC QN AUTO: 31.9 PG (ref 27–33)
MCHC RBC AUTO-ENTMCNC: 34 G/DL (ref 32–36)
MCV RBC AUTO: 93.9 FL (ref 80–100)
MICROALBUMIN UR-MCNC: 1.8 MG/DL
MONOCYTES # BLD AUTO: 1206 CELLS/UL (ref 200–950)
MONOCYTES NFR BLD AUTO: 13.4 %
NEUTROPHILS # BLD AUTO: 4959 CELLS/UL (ref 1500–7800)
NEUTROPHILS NFR BLD AUTO: 55.1 %
NONHDLC SERPL-MCNC: 160 MG/DL (CALC)
PLATELET # BLD AUTO: 347 THOUSAND/UL (ref 140–400)
PMV BLD REES-ECKER: 10.6 FL (ref 7.5–12.5)
POTASSIUM SERPL-SCNC: 4.4 MMOL/L (ref 3.5–5.3)
PROT SERPL-MCNC: 7.1 G/DL (ref 6.1–8.1)
RBC # BLD AUTO: 4.76 MILLION/UL (ref 3.8–5.1)
SODIUM SERPL-SCNC: 139 MMOL/L (ref 135–146)
TRIGL SERPL-MCNC: 106 MG/DL
WBC # BLD AUTO: 9 THOUSAND/UL (ref 3.8–10.8)

## 2023-01-31 ENCOUNTER — OFFICE VISIT (OUTPATIENT)
Dept: FAMILY MEDICINE | Facility: CLINIC | Age: 53
End: 2023-01-31
Payer: COMMERCIAL

## 2023-01-31 VITALS
HEART RATE: 94 BPM | HEIGHT: 67 IN | BODY MASS INDEX: 34.63 KG/M2 | WEIGHT: 220.63 LBS | DIASTOLIC BLOOD PRESSURE: 82 MMHG | OXYGEN SATURATION: 97 % | SYSTOLIC BLOOD PRESSURE: 132 MMHG

## 2023-01-31 DIAGNOSIS — N95.1 MENOPAUSAL SYMPTOMS: ICD-10-CM

## 2023-01-31 DIAGNOSIS — E66.9 CLASS 1 OBESITY WITH SERIOUS COMORBIDITY AND BODY MASS INDEX (BMI) OF 33.0 TO 33.9 IN ADULT, UNSPECIFIED OBESITY TYPE: ICD-10-CM

## 2023-01-31 DIAGNOSIS — I10 ESSENTIAL HYPERTENSION: ICD-10-CM

## 2023-01-31 DIAGNOSIS — Z00.00 ANNUAL PHYSICAL EXAM: Primary | ICD-10-CM

## 2023-01-31 PROCEDURE — 3066F NEPHROPATHY DOC TX: CPT | Mod: CPTII,S$GLB,, | Performed by: FAMILY MEDICINE

## 2023-01-31 PROCEDURE — 4010F PR ACE/ARB THEARPY RXD/TAKEN: ICD-10-PCS | Mod: CPTII,S$GLB,, | Performed by: FAMILY MEDICINE

## 2023-01-31 PROCEDURE — 3075F SYST BP GE 130 - 139MM HG: CPT | Mod: CPTII,S$GLB,, | Performed by: FAMILY MEDICINE

## 2023-01-31 PROCEDURE — 99396 PR PREVENTIVE VISIT,EST,40-64: ICD-10-PCS | Mod: S$GLB,,, | Performed by: FAMILY MEDICINE

## 2023-01-31 PROCEDURE — 1159F PR MEDICATION LIST DOCUMENTED IN MEDICAL RECORD: ICD-10-PCS | Mod: CPTII,S$GLB,, | Performed by: FAMILY MEDICINE

## 2023-01-31 PROCEDURE — 3079F DIAST BP 80-89 MM HG: CPT | Mod: CPTII,S$GLB,, | Performed by: FAMILY MEDICINE

## 2023-01-31 PROCEDURE — 3008F BODY MASS INDEX DOCD: CPT | Mod: CPTII,S$GLB,, | Performed by: FAMILY MEDICINE

## 2023-01-31 PROCEDURE — 4010F ACE/ARB THERAPY RXD/TAKEN: CPT | Mod: CPTII,S$GLB,, | Performed by: FAMILY MEDICINE

## 2023-01-31 PROCEDURE — 3075F PR MOST RECENT SYSTOLIC BLOOD PRESS GE 130-139MM HG: ICD-10-PCS | Mod: CPTII,S$GLB,, | Performed by: FAMILY MEDICINE

## 2023-01-31 PROCEDURE — 3079F PR MOST RECENT DIASTOLIC BLOOD PRESSURE 80-89 MM HG: ICD-10-PCS | Mod: CPTII,S$GLB,, | Performed by: FAMILY MEDICINE

## 2023-01-31 PROCEDURE — 1159F MED LIST DOCD IN RCRD: CPT | Mod: CPTII,S$GLB,, | Performed by: FAMILY MEDICINE

## 2023-01-31 PROCEDURE — 3008F PR BODY MASS INDEX (BMI) DOCUMENTED: ICD-10-PCS | Mod: CPTII,S$GLB,, | Performed by: FAMILY MEDICINE

## 2023-01-31 PROCEDURE — 3061F NEG MICROALBUMINURIA REV: CPT | Mod: CPTII,S$GLB,, | Performed by: FAMILY MEDICINE

## 2023-01-31 PROCEDURE — 3061F PR NEG MICROALBUMINURIA RESULT DOCUMENTED/REVIEW: ICD-10-PCS | Mod: CPTII,S$GLB,, | Performed by: FAMILY MEDICINE

## 2023-01-31 PROCEDURE — 3066F PR DOCUMENTATION OF TREATMENT FOR NEPHROPATHY: ICD-10-PCS | Mod: CPTII,S$GLB,, | Performed by: FAMILY MEDICINE

## 2023-01-31 PROCEDURE — 99396 PREV VISIT EST AGE 40-64: CPT | Mod: S$GLB,,, | Performed by: FAMILY MEDICINE

## 2023-01-31 RX ORDER — SEMAGLUTIDE 0.5 MG/.5ML
0.5 INJECTION, SOLUTION SUBCUTANEOUS
Qty: 2 ML | Refills: 1 | Status: SHIPPED | OUTPATIENT
Start: 2023-01-31 | End: 2023-07-31 | Stop reason: SDUPTHER

## 2023-01-31 RX ORDER — CLONIDINE HYDROCHLORIDE 0.1 MG/1
0.1 TABLET ORAL NIGHTLY
Qty: 90 TABLET | Refills: 1 | Status: SHIPPED | OUTPATIENT
Start: 2023-01-31 | End: 2023-11-01 | Stop reason: SDUPTHER

## 2023-01-31 NOTE — PROGRESS NOTES
SUBJECTIVE:   HPI: Nohelia Turk  is a 52 y.o. female who presents for annual physical .   Annual Exam (Annual exam/ right leg swelling started x 1 week/ had flu vaccine at work 10/2022// mp)    Patient with hypertension presents for her annual examination.  Recently started on Jonathan for her blood pressure.  Is tolerating medication and values are in acceptable range.  Uses low-dose clonidine at night to help with hot flashes and continues to find this beneficial.  She is up-to-date with well-woman examination, mammogram and colon cancer screening.  Menstrual cycles are irregular but still present.  Labs performed demonstrate mild hyperlipidemia otherwise all within normal range    Received flu vaccine at her job and has had 1 COVID vaccination (j&J).  Eye and dental exams are scheduled for next month.  Mood has been doing well on low-dose venlafaxine.      Patient had been working on her weight.  Started on Wegovy but has had some difficulty finding the medication.  Did tolerate well.    Patient is making lifestyle modifications for her diet but unfortunately continues to smoke. She is in the comtemplation stage for quitting     (Not in a hospital admission)    Review of patient's allergies indicates:  No Known Allergies  Current Outpatient Medications on File Prior to Visit   Medication Sig Dispense Refill    amlodipine-olmesartan (JONATHAN) 5-20 mg per tablet Take 1 tablet by mouth once daily. 90 tablet 3    venlafaxine (EFFEXOR-XR) 75 MG 24 hr capsule Take 1 capsule (75 mg total) by mouth once daily. 90 capsule 1    [DISCONTINUED] cloNIDine (CATAPRES) 0.2 MG tablet Take 1 tablet (0.2 mg total) by mouth nightly. For hot flashes 90 tablet 1    [DISCONTINUED] semaglutide, weight loss, (WEGOVY) 0.5 mg/0.5 mL PnIj Inject 0.5 mg into the skin every 7 days. (Patient not taking: Reported on 1/31/2023) 2 mL 1     No current facility-administered medications on file prior to visit.     Past Medical History:   Diagnosis  "Date    Essential hypertension 11/21/2022     History reviewed. No pertinent surgical history.  Family History   Problem Relation Age of Onset    Hypertension Mother     Heart disease Mother 74        CHF    Osteoporosis Mother     Cancer Father 73        colon & prostate    Diabetes Father     Hypertension Father      Social History     Tobacco Use    Smoking status: Every Day     Packs/day: 0.50     Types: Cigarettes     Last attempt to quit: 1/1/2013     Years since quitting: 10.0    Smokeless tobacco: Never   Substance Use Topics    Alcohol use: Yes     Comment: periodically at dinner    Drug use: Never      Health Maintenance Topics with due status: Not Due       Topic Last Completion Date    Colorectal Cancer Screening 05/06/2019    Cervical Cancer Screening 01/31/2022    Lipid Panel 01/25/2023     Immunization History   Administered Date(s) Administered    COVID-19, vector-nr, rS-Ad26, PF ([employer]) 04/05/2021       Review of Systems   Constitutional:  Negative for activity change, fatigue and unexpected weight change.   HENT:  Negative for hearing loss, postnasal drip, sinus pressure, sore throat and voice change.    Eyes:  Negative for photophobia and visual disturbance.   Respiratory:  Negative for cough, shortness of breath and wheezing.    Cardiovascular:  Negative for chest pain and palpitations.   Gastrointestinal:  Negative for constipation, diarrhea and nausea.   Genitourinary:  Negative for difficulty urinating, frequency, hematuria and urgency.   Musculoskeletal:  Negative for arthralgias and back pain.   Skin:  Negative for rash.   Neurological:  Negative for weakness, light-headedness and headaches.   Hematological:  Negative for adenopathy. Does not bruise/bleed easily.   Psychiatric/Behavioral:  The patient is not nervous/anxious.     OBJECTIVE:      Vitals:    01/31/23 1550   BP: 132/82   Pulse: 94   SpO2: 97%   Weight: 100.1 kg (220 lb 9.6 oz)   Height: 5' 7" (1.702 m)     Physical " Exam  Constitutional:       Appearance: Normal appearance. She is obese.   HENT:      Head: Normocephalic and atraumatic.      Mouth/Throat:      Mouth: Mucous membranes are moist.   Eyes:      Conjunctiva/sclera: Conjunctivae normal.   Cardiovascular:      Rate and Rhythm: Normal rate and regular rhythm.   Pulmonary:      Effort: Pulmonary effort is normal.   Musculoskeletal:      Right lower leg: Edema present.   Neurological:      General: No focal deficit present.      Mental Status: She is alert and oriented to person, place, and time.   Psychiatric:         Mood and Affect: Mood normal.         Behavior: Behavior normal.      Assessment:       1. Annual physical exam    2. Class 1 obesity with serious comorbidity and body mass index (BMI) of 33.0 to 33.9 in adult, unspecified obesity type    3. Essential hypertension    4. Menopausal symptoms        Plan:       Annual physical exam    Class 1 obesity with serious comorbidity and body mass index (BMI) of 33.0 to 33.9 in adult, unspecified obesity type  -     semaglutide, weight loss, (WEGOVY) 0.5 mg/0.5 mL PnIj; Inject 0.5 mg into the skin every 7 days.  Dispense: 2 mL; Refill: 1  Patient to contact office when it is time for refills    Essential hypertension  Continue Heather    Menopausal symptoms  -     cloNIDine (CATAPRES) 0.1 MG tablet; Take 1 tablet (0.1 mg total) by mouth every evening.  Dispense: 90 tablet; Refill: 1        Counseled on age and gender appropriate medical preventative services, including cancer screenings, immunizations, overall nutritional health, need for a consistent exercise regimen and an overall push towards maintaining a vigorous and active lifestyle.      Follow up in about 6 months (around 7/31/2023) for HTN.

## 2023-02-15 ENCOUNTER — PATIENT MESSAGE (OUTPATIENT)
Dept: FAMILY MEDICINE | Facility: CLINIC | Age: 53
End: 2023-02-15

## 2023-02-15 DIAGNOSIS — I10 ESSENTIAL HYPERTENSION: Primary | ICD-10-CM

## 2023-02-16 RX ORDER — OLMESARTAN MEDOXOMIL AND HYDROCHLOROTHIAZIDE 40/25 40; 25 MG/1; MG/1
1 TABLET ORAL DAILY
Qty: 90 TABLET | Refills: 3 | Status: SHIPPED | OUTPATIENT
Start: 2023-02-16 | End: 2023-07-31

## 2023-02-16 NOTE — TELEPHONE ENCOUNTER
prescription sent to   [facility] - [place], [place] - [address] [place] [id number]  Phone: [phone] Fax: [phone]

## 2023-02-25 DIAGNOSIS — F43.21 SITUATIONAL DEPRESSION: ICD-10-CM

## 2023-02-25 DIAGNOSIS — N95.1 MENOPAUSAL SYMPTOMS: ICD-10-CM

## 2023-02-28 DIAGNOSIS — F43.21 SITUATIONAL DEPRESSION: ICD-10-CM

## 2023-02-28 DIAGNOSIS — N95.1 MENOPAUSAL SYMPTOMS: ICD-10-CM

## 2023-02-28 RX ORDER — VENLAFAXINE HYDROCHLORIDE 75 MG/1
75 CAPSULE, EXTENDED RELEASE ORAL DAILY
Qty: 90 CAPSULE | Refills: 1 | Status: CANCELLED | OUTPATIENT
Start: 2023-02-28

## 2023-02-28 RX ORDER — VENLAFAXINE HYDROCHLORIDE 75 MG/1
75 CAPSULE, EXTENDED RELEASE ORAL DAILY
Qty: 90 CAPSULE | Refills: 3 | Status: SHIPPED | OUTPATIENT
Start: 2023-02-28 | End: 2023-07-31 | Stop reason: SDUPTHER

## 2023-02-28 NOTE — TELEPHONE ENCOUNTER
prescription sent to   [facility] - [place], [place] - [address] [place] [id number]  Phone: [phone] Fax: [phone]

## 2023-03-15 DIAGNOSIS — Z12.31 OTHER SCREENING MAMMOGRAM: ICD-10-CM

## 2023-03-17 ENCOUNTER — PATIENT MESSAGE (OUTPATIENT)
Dept: RESEARCH | Facility: HOSPITAL | Age: 53
End: 2023-03-17
Payer: COMMERCIAL

## 2023-04-11 ENCOUNTER — PATIENT MESSAGE (OUTPATIENT)
Dept: ADMINISTRATIVE | Facility: HOSPITAL | Age: 53
End: 2023-04-11
Payer: COMMERCIAL

## 2023-07-24 ENCOUNTER — TELEPHONE (OUTPATIENT)
Dept: FAMILY MEDICINE | Facility: CLINIC | Age: 53
End: 2023-07-24

## 2023-07-26 ENCOUNTER — TELEPHONE (OUTPATIENT)
Dept: FAMILY MEDICINE | Facility: CLINIC | Age: 53
End: 2023-07-26

## 2023-07-26 NOTE — TELEPHONE ENCOUNTER
----- Message from Carole Leon sent at 7/25/2023  3:38 PM CDT -----  Patient come in for her appointment please call  to reschedule [phone] She also wants to talk about  med refills.

## 2023-07-31 ENCOUNTER — OFFICE VISIT (OUTPATIENT)
Dept: FAMILY MEDICINE | Facility: CLINIC | Age: 53
End: 2023-07-31
Payer: COMMERCIAL

## 2023-07-31 ENCOUNTER — HOSPITAL ENCOUNTER (OUTPATIENT)
Dept: RADIOLOGY | Facility: HOSPITAL | Age: 53
Discharge: HOME | End: 2023-07-31
Payer: COMMERCIAL

## 2023-07-31 VITALS
WEIGHT: 227.38 LBS | HEIGHT: 67 IN | HEART RATE: 85 BPM | OXYGEN SATURATION: 97 % | DIASTOLIC BLOOD PRESSURE: 92 MMHG | BODY MASS INDEX: 35.69 KG/M2 | SYSTOLIC BLOOD PRESSURE: 122 MMHG

## 2023-07-31 DIAGNOSIS — J41.1 CHRONIC BRONCHITIS WITH PRODUCTIVE MUCOPURULENT COUGH: ICD-10-CM

## 2023-07-31 DIAGNOSIS — M70.72 BURSITIS OF LEFT HIP, UNSPECIFIED BURSA: ICD-10-CM

## 2023-07-31 DIAGNOSIS — I10 ESSENTIAL HYPERTENSION: Primary | ICD-10-CM

## 2023-07-31 DIAGNOSIS — E66.01 SEVERE OBESITY (BMI 35.0-39.9) WITH COMORBIDITY: ICD-10-CM

## 2023-07-31 DIAGNOSIS — N95.1 MENOPAUSAL SYMPTOMS: ICD-10-CM

## 2023-07-31 DIAGNOSIS — F17.218 CIGARETTE NICOTINE DEPENDENCE WITH OTHER NICOTINE-INDUCED DISORDER: ICD-10-CM

## 2023-07-31 DIAGNOSIS — F43.21 SITUATIONAL DEPRESSION: ICD-10-CM

## 2023-07-31 PROCEDURE — 4010F ACE/ARB THERAPY RXD/TAKEN: CPT | Mod: CPTII,S$GLB,,

## 2023-07-31 PROCEDURE — 99214 PR OFFICE/OUTPT VISIT, EST, LEVL IV, 30-39 MIN: ICD-10-PCS | Mod: S$GLB,,,

## 2023-07-31 PROCEDURE — 3066F PR DOCUMENTATION OF TREATMENT FOR NEPHROPATHY: ICD-10-PCS | Mod: CPTII,S$GLB,,

## 2023-07-31 PROCEDURE — 3080F DIAST BP >= 90 MM HG: CPT | Mod: CPTII,S$GLB,,

## 2023-07-31 PROCEDURE — 3008F PR BODY MASS INDEX (BMI) DOCUMENTED: ICD-10-PCS | Mod: CPTII,S$GLB,,

## 2023-07-31 PROCEDURE — 3074F SYST BP LT 130 MM HG: CPT | Mod: CPTII,S$GLB,,

## 2023-07-31 PROCEDURE — 3061F PR NEG MICROALBUMINURIA RESULT DOCUMENTED/REVIEW: ICD-10-PCS | Mod: CPTII,S$GLB,,

## 2023-07-31 PROCEDURE — 71046 X-RAY EXAM CHEST 2 VIEWS: CPT | Mod: TC,PO

## 2023-07-31 PROCEDURE — 3074F PR MOST RECENT SYSTOLIC BLOOD PRESSURE < 130 MM HG: ICD-10-PCS | Mod: CPTII,S$GLB,,

## 2023-07-31 PROCEDURE — 3061F NEG MICROALBUMINURIA REV: CPT | Mod: CPTII,S$GLB,,

## 2023-07-31 PROCEDURE — 1160F PR REVIEW ALL MEDS BY PRESCRIBER/CLIN PHARMACIST DOCUMENTED: ICD-10-PCS | Mod: CPTII,S$GLB,,

## 2023-07-31 PROCEDURE — 99214 OFFICE O/P EST MOD 30 MIN: CPT | Mod: S$GLB,,,

## 2023-07-31 PROCEDURE — 4010F PR ACE/ARB THEARPY RXD/TAKEN: ICD-10-PCS | Mod: CPTII,S$GLB,,

## 2023-07-31 PROCEDURE — 3008F BODY MASS INDEX DOCD: CPT | Mod: CPTII,S$GLB,,

## 2023-07-31 PROCEDURE — 1159F MED LIST DOCD IN RCRD: CPT | Mod: CPTII,S$GLB,,

## 2023-07-31 PROCEDURE — 3080F PR MOST RECENT DIASTOLIC BLOOD PRESSURE >= 90 MM HG: ICD-10-PCS | Mod: CPTII,S$GLB,,

## 2023-07-31 PROCEDURE — 1159F PR MEDICATION LIST DOCUMENTED IN MEDICAL RECORD: ICD-10-PCS | Mod: CPTII,S$GLB,,

## 2023-07-31 PROCEDURE — 1160F RVW MEDS BY RX/DR IN RCRD: CPT | Mod: CPTII,S$GLB,,

## 2023-07-31 PROCEDURE — 3066F NEPHROPATHY DOC TX: CPT | Mod: CPTII,S$GLB,,

## 2023-07-31 RX ORDER — BENZONATATE 200 MG/1
200 CAPSULE ORAL 3 TIMES DAILY PRN
Qty: 90 CAPSULE | Refills: 3 | Status: SHIPPED | OUTPATIENT
Start: 2023-07-31 | End: 2023-08-10

## 2023-07-31 RX ORDER — IBUPROFEN 600 MG/1
600 TABLET ORAL 3 TIMES DAILY PRN
Qty: 90 TABLET | Refills: 3 | Status: SHIPPED | OUTPATIENT
Start: 2023-07-31 | End: 2023-11-01

## 2023-07-31 RX ORDER — SEMAGLUTIDE 0.5 MG/.5ML
0.5 INJECTION, SOLUTION SUBCUTANEOUS
Qty: 2 ML | Refills: 1 | Status: SHIPPED | OUTPATIENT
Start: 2023-07-31 | End: 2023-08-01

## 2023-07-31 RX ORDER — HYDROCHLOROTHIAZIDE 25 MG/1
25 TABLET ORAL DAILY
Qty: 90 TABLET | Refills: 3 | Status: SHIPPED | OUTPATIENT
Start: 2023-07-31 | End: 2024-03-14

## 2023-07-31 RX ORDER — LOSARTAN POTASSIUM 100 MG/1
100 TABLET ORAL DAILY
Qty: 90 TABLET | Refills: 3 | Status: SHIPPED | OUTPATIENT
Start: 2023-07-31 | End: 2023-11-01

## 2023-07-31 RX ORDER — VENLAFAXINE HYDROCHLORIDE 75 MG/1
75 CAPSULE, EXTENDED RELEASE ORAL DAILY
Qty: 90 CAPSULE | Refills: 3 | Status: SHIPPED | OUTPATIENT
Start: 2023-07-31

## 2023-07-31 RX ORDER — ALBUTEROL SULFATE 90 UG/1
2 AEROSOL, METERED RESPIRATORY (INHALATION) EVERY 6 HOURS PRN
Qty: 18 G | Refills: 3 | Status: SHIPPED | OUTPATIENT
Start: 2023-07-31 | End: 2024-01-04

## 2023-07-31 RX ORDER — FLUTICASONE PROPIONATE 50 MCG
1 SPRAY, SUSPENSION (ML) NASAL DAILY
Qty: 18.2 ML | Refills: 3 | Status: SHIPPED | OUTPATIENT
Start: 2023-07-31

## 2023-08-01 ENCOUNTER — PATIENT MESSAGE (OUTPATIENT)
Dept: FAMILY MEDICINE | Facility: CLINIC | Age: 53
End: 2023-08-01

## 2023-08-01 ENCOUNTER — TELEPHONE (OUTPATIENT)
Dept: FAMILY MEDICINE | Facility: CLINIC | Age: 53
End: 2023-08-01
Payer: COMMERCIAL

## 2023-08-01 ENCOUNTER — TELEPHONE (OUTPATIENT)
Dept: FAMILY MEDICINE | Facility: CLINIC | Age: 53
End: 2023-08-01

## 2023-08-01 PROBLEM — F17.219 CIGARETTE NICOTINE DEPENDENCE WITH NICOTINE-INDUCED DISORDER: Status: ACTIVE | Noted: 2022-11-21

## 2023-08-01 RX ORDER — SEMAGLUTIDE 0.5 MG/.5ML
0.5 INJECTION, SOLUTION SUBCUTANEOUS
Qty: 2 ML | Refills: 1 | Status: SHIPPED | OUTPATIENT
Start: 2023-08-01 | End: 2023-08-01

## 2023-08-01 NOTE — PROGRESS NOTES
"  SUBJECTIVE:    Patient ID: Nohelia Turk is a 53 y.o. female.    Chief Complaint: Follow-up (6 mo follow up/ requesting pain med for muscle pain/ requests wegovy/  chest congestion wheezing and rattle x 2 mo/ requests olmesartan without diuretic due to leg cramps/ right middle finger gets stuck//mp)    53-year-old established female patient here for regular checkup.  Patient has been having pain in her left hip.  She states that she is been having this for quite some time.  She was previously taking 600 mg prescription strength ibuprofen.  She would like to continue this for the pain and inflammation.  She states that she can not recall whether she injured the hip or not.  She did have previous x-rays which did not show any acute concerns.  Patient does also feel that her pain could be related to her weight and she would like to discuss medications to help her lose weight.  Patient has also been suffering with  chest congestion and wheezing for approximately 2 mos with a productive cough.  She also  requests olmesartan without diuretic due to leg cramps.  She thinks that a diuretic is depleting her potassium.  She also reports that her  right middle finger gets stuck occasionally.  This is painless, just disconcerting. Patient states her "bottom number" of her blood pressure has been running in the 90's at home.     Patient is due for mammogram.  She understands that she is had standing orders.  Discussed Shingrix vaccination with patient.  Also discussed routine screening colonoscopies.      Follow-up  Associated symptoms include arthralgias and coughing. Pertinent negatives include no abdominal pain, chest pain, chills, congestion, fatigue, fever, headaches, myalgias, nausea, numbness, rash, sore throat, vomiting or weakness.       No visits with results within 6 Month(s) from this visit.   Latest known visit with results is:   Office Visit on 11/21/2022   Component Date Value Ref Range Status    WBC " 01/25/2023 9.0  3.8 - 10.8 Thousand/uL Final    RBC 01/25/2023 4.76  3.80 - 5.10 Million/uL Final    Hemoglobin 01/25/2023 15.2  11.7 - 15.5 g/dL Final    Hematocrit 01/25/2023 44.7  35.0 - 45.0 % Final    MCV 01/25/2023 93.9  80.0 - 100.0 fL Final    MCH 01/25/2023 31.9  27.0 - 33.0 pg Final    MCHC 01/25/2023 34.0  32.0 - 36.0 g/dL Final    RDW 01/25/2023 13.2  11.0 - 15.0 % Final    Platelets 01/25/2023 347  140 - 400 Thousand/uL Final    MPV 01/25/2023 10.6  7.5 - 12.5 fL Final    Neutrophils, Abs 01/25/2023 4,959  1,500 - 7,800 cells/uL Final    Lymph # 01/25/2023 2,619  850 - 3,900 cells/uL Final    Mono # 01/25/2023 1,206 (H)  200 - 950 cells/uL Final    Eos # 01/25/2023 144  15 - 500 cells/uL Final    Baso # 01/25/2023 72  0 - 200 cells/uL Final    Neutrophils Relative 01/25/2023 55.1  % Final    Lymph % 01/25/2023 29.1  % Final    Mono % 01/25/2023 13.4  % Final    Eosinophil % 01/25/2023 1.6  % Final    Basophil % 01/25/2023 0.8  % Final    Creatinine, Urine 01/25/2023 158  20 - 275 mg/dL Final    Microalb, Ur 01/25/2023 1.8  See Note: mg/dL Final    Microalb/Creat Ratio 01/25/2023 11  <30 mcg/mg creat Final    Cholesterol 01/25/2023 213 (H)  <200 mg/dL Final    HDL 01/25/2023 53  > OR = 50 mg/dL Final    Triglycerides 01/25/2023 106  <150 mg/dL Final    LDL Cholesterol 01/25/2023 138 (H)  mg/dL (calc) Final    HDL/Cholesterol Ratio 01/25/2023 4.0  <5.0 (calc) Final    Non HDL Chol. (LDL+VLDL) 01/25/2023 160 (H)  <130 mg/dL (calc) Final    Glucose 01/25/2023 116 (H)  65 - 99 mg/dL Final    BUN 01/25/2023 17  7 - 25 mg/dL Final    Creatinine 01/25/2023 0.78  0.50 - 1.03 mg/dL Final    eGFR 01/25/2023 90  > OR = 60 mL/min/1.73m2 Final    BUN/Creatinine Ratio 01/25/2023 NOT APPLICABLE  6 - 22 (calc) Final    Sodium 01/25/2023 139  135 - 146 mmol/L Final    Potassium 01/25/2023 4.4  3.5 - 5.3 mmol/L Final    Chloride 01/25/2023 99  98 - 110 mmol/L Final    CO2 01/25/2023 35 (H)  20 - 32 mmol/L Final    Calcium  01/25/2023 9.0  8.6 - 10.4 mg/dL Final    Total Protein 01/25/2023 7.1  6.1 - 8.1 g/dL Final    Albumin 01/25/2023 4.2  3.6 - 5.1 g/dL Final    Globulin, Total 01/25/2023 2.9  1.9 - 3.7 g/dL (calc) Final    Albumin/Globulin Ratio 01/25/2023 1.4  1.0 - 2.5 (calc) Final    Total Bilirubin 01/25/2023 0.5  0.2 - 1.2 mg/dL Final    Alkaline Phosphatase 01/25/2023 86  37 - 153 U/L Final    AST 01/25/2023 21  10 - 35 U/L Final    ALT 01/25/2023 25  6 - 29 U/L Final       Past Medical History:   Diagnosis Date    Essential hypertension 11/21/2022     Social History     Socioeconomic History    Marital status:    Tobacco Use    Smoking status: Every Day     Current packs/day: 0.00     Types: Cigarettes     Last attempt to quit: 1/1/2013     Years since quitting: 10.5    Smokeless tobacco: Never   Substance and Sexual Activity    Alcohol use: Yes     Comment: periodically at dinner    Drug use: Never   Social History Narrative    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 No pertinent surgical history.  Family History   Problem Relation Age of Onset    Hypertension Mother     Heart disease Mother 74        CHF    Osteoporosis Mother     Cancer Father 73        colon & prostate    Diabetes Father     Hypertension Father        Review of patient's allergies indicates:  No Known Allergies    Current Outpatient Medications:     cloNIDine (CATAPRES) 0.1 MG tablet, Take 1 tablet (0.1 mg total) by mouth every evening., Disp: 90 tablet, Rfl: 1    albuterol (VENTOLIN HFA) 90 mcg/actuation inhaler, Inhale 2 puffs into the lungs every 6 (six) hours as needed for Wheezing. Rescue, Disp: 18 g, Rfl: 3    benzonatate (TESSALON) 200 MG capsule, Take 1 capsule (200 mg total) by mouth 3 (three) times daily as needed for Cough., Disp: 90 capsule, Rfl: 3     "fluticasone propionate (FLONASE) 50 mcg/actuation nasal spray, 1 spray (50 mcg total) by Each Nostril route once daily., Disp: 18.2 mL, Rfl: 3    hydroCHLOROthiazide (HYDRODIURIL) 25 MG tablet, Take 1 tablet (25 mg total) by mouth once daily., Disp: 90 tablet, Rfl: 3    ibuprofen (ADVIL,MOTRIN) 600 MG tablet, Take 1 tablet (600 mg total) by mouth 3 (three) times daily as needed for Pain., Disp: 90 tablet, Rfl: 3    losartan (COZAAR) 100 MG tablet, Take 1 tablet (100 mg total) by mouth once daily., Disp: 90 tablet, Rfl: 3    venlafaxine (EFFEXOR-XR) 75 MG 24 hr capsule, Take 1 capsule (75 mg total) by mouth once daily., Disp: 90 capsule, Rfl: 3    Review of Systems   Constitutional:  Negative for activity change, appetite change, chills, fatigue, fever and unexpected weight change.   HENT:  Positive for postnasal drip. Negative for nasal congestion, ear pain, rhinorrhea, sore throat and trouble swallowing.    Eyes:  Negative for discharge and visual disturbance.   Respiratory:  Positive for cough and wheezing. Negative for apnea and shortness of breath.         Patient reports chronic cough with excessive mucus production especially upon first waking. Patient does feel like she wheezes sometimes and sometimes she feels like she just cannot seem to get a good deep breath.   Cardiovascular:  Negative for chest pain, palpitations and leg swelling.   Gastrointestinal:  Negative for abdominal pain, blood in stool, constipation, diarrhea, nausea, vomiting and reflux.   Genitourinary:  Negative for bladder incontinence, dysuria, frequency and urgency.   Musculoskeletal:  Positive for arthralgias and gait problem. Negative for leg pain and myalgias.        Left hip pain. Limping upon initiating ambulation after being stationary for any period of time. Reports that pain feels "internal" like in her groin.   Integumentary:  Negative for rash and mole/lesion.   Neurological:  Negative for dizziness, tremors, weakness, " "light-headedness, numbness and headaches.   Hematological:  Does not bruise/bleed easily.   Psychiatric/Behavioral:  Positive for dysphoric mood. Negative for sleep disturbance and suicidal ideas. The patient is nervous/anxious.            Objective:      Vitals:    07/31/23 1503 07/31/23 1518   BP: (!) 130/90 (!) 122/92   Pulse: 85    SpO2: 97%    Weight: 103.1 kg (227 lb 6.4 oz)    Height: 5' 7" (1.702 m)      Physical Exam  Vitals and nursing note reviewed.   Constitutional:       General: She is not in acute distress.     Appearance: Normal appearance. She is well-developed.      Comments: overweight   HENT:      Head: Normocephalic and atraumatic.      Right Ear: External ear normal.      Left Ear: External ear normal.      Nose: Nose normal. No congestion.      Mouth/Throat:      Mouth: Mucous membranes are moist.      Pharynx: Oropharynx is clear. No oropharyngeal exudate or posterior oropharyngeal erythema.   Eyes:      General:         Right eye: No discharge.         Left eye: No discharge.      Conjunctiva/sclera: Conjunctivae normal.      Pupils: Pupils are equal, round, and reactive to light.   Neck:      Thyroid: No thyromegaly.   Cardiovascular:      Rate and Rhythm: Normal rate and regular rhythm.      Pulses: Normal pulses.      Heart sounds: Normal heart sounds. No murmur heard.  Pulmonary:      Effort: Pulmonary effort is normal.      Breath sounds: Wheezing present. No rales.      Comments: Diminished in bases bilaterally  Abdominal:      Palpations: Abdomen is soft.      Tenderness: There is no abdominal tenderness.   Musculoskeletal:         General: No tenderness or deformity. Normal range of motion.      Cervical back: Normal range of motion.      Lumbar back: Normal. No spasms.      Right lower leg: No edema.      Left lower leg: No edema.      Comments: Good ROM throughout. No TTP over trochanteric bursa or sciatic nerve. Patient describes pain as being "inside" her hip, with referral to " "inguinal region.   Skin:     General: Skin is warm and dry.      Capillary Refill: Capillary refill takes less than 2 seconds.      Coloration: Skin is not jaundiced.      Findings: No rash.   Neurological:      General: No focal deficit present.      Mental Status: She is alert and oriented to person, place, and time.      Cranial Nerves: No cranial nerve deficit.      Motor: No weakness.      Coordination: Coordination normal.      Gait: Gait abnormal.      Comments: When first standing has a bit of a limp due to left hip pain.   Psychiatric:         Mood and Affect: Mood normal.         Behavior: Behavior normal.           Assessment:       1. Essential hypertension    2. Cigarette nicotine dependence with other nicotine-induced disorder    3. Menopausal symptoms    4. Situational depression    5. Severe obesity (BMI 35.0-39.9) with comorbidity    6. Bursitis of left hip, unspecified bursa    7. Chronic bronchitis with productive mucopurulent cough         Plan:       Essential hypertension  122/92 in clinic today. Patient states her "bottom number" has been running in the 90's at home. She also states that she would like to separate her blood pressure medication from her "fluid pill." Discussed with patient that I can prescribe 2 separate pills. Discussed checking her blood pressure at home since we are making a med  change. Advised to check once a day at different times of the day. Also instructed patient that if her readings are less than 130 and less than 80, and she does not feel she "needs" the entire 25mg HCTZ, she can take half. Patient verbalizes understanding and agrees with plan.  -     losartan (COZAAR) 100 MG tablet; Take 1 tablet (100 mg total) by mouth once daily.  Dispense: 90 tablet; Refill: 3  -     hydroCHLOROthiazide (HYDRODIURIL) 25 MG tablet; Take 1 tablet (25 mg total) by mouth once daily.  Dispense: 90 tablet; Refill: 3    Cigarette nicotine dependence with other nicotine-induced " "disorder  Chronic cough.  -     Ambulatory referral/consult to Pulmonology; Future; Expected date: 08/08/2023    Menopausal symptoms  Continue clonidine for vasomotor symptoms.     Situational depression  Continue effexor  -     venlafaxine (EFFEXOR-XR) 75 MG 24 hr capsule; Take 1 capsule (75 mg total) by mouth once daily.  Dispense: 90 capsule; Refill: 3      Bursitis of left hip, unspecified bursa  No ttp over trochanteric bursa. Patient reports pain feels "deeper."  -     ibuprofen (ADVIL,MOTRIN) 600 MG tablet; Take 1 tablet (600 mg total) by mouth 3 (three) times daily as needed for Pain.  Dispense: 90 tablet; Refill: 3  -     Ambulatory referral/consult to Orthopedics; Future; Expected date: 08/07/2023      Severe obesity (BMI 35.0-39.9) with comorbidity   Discussed weight loss to improve overall health including her hypertension and sensation of feeling like she cannot always take a deep breath.   -     semaglutide, weight loss, (WEGOVY) 0.5 mg/0.5 mL PnIj; Inject 0.5 mg into the skin every 7 days.  Dispense: 2 mL; Refill: 1    Chronic bronchitis with productive mucopurulent cough  Advised patient that depending on xray results may refer to pulmonology. Advised to get Mucinex DM and start taking BID. Also advised OTC allergy relief such as Zyrtec daily. Start Flonase. May take tessalon perles as needed for bothersome cough.  -     X-Ray Chest PA And Lateral; Future; Expected date: 07/31/2023  -     albuterol (VENTOLIN HFA) 90 mcg/actuation inhaler; Inhale 2 puffs into the lungs every 6 (six) hours as needed for Wheezing. Rescue  Dispense: 18 g; Refill: 3  -     benzonatate (TESSALON) 200 MG capsule; Take 1 capsule (200 mg total) by mouth 3 (three) times daily as needed for Cough.  Dispense: 90 capsule; Refill: 3  -     fluticasone propionate (FLONASE) 50 mcg/actuation nasal spray; 1 spray (50 mcg total) by Each Nostril route once daily.  Dispense: 18.2 mL; Refill: 3  -     Ambulatory referral/consult to " Pulmonology; Future; Expected date: 08/08/2023      Follow up in about 3 months (around 10/31/2023).        8/2/2023 Hannah Garcia

## 2023-08-01 NOTE — PROGRESS NOTES
Please let Mrs. Pozo know that her chest xray was normal and shows no evidence of any pneumonia. I do want to refer her to pulmonology for chronic cough for evaluation of pulmonary functioning.

## 2023-08-01 NOTE — TELEPHONE ENCOUNTER
----- Message from NITIN Jacobson-CNP sent at 8/1/2023  7:53 AM CDT -----  Please let Mrs. Pozo know that her chest xray was normal and shows no evidence of any pneumonia. I do want to refer her to pulmonology for chronic cough for evaluation of pulmonary functioning.

## 2023-08-02 ENCOUNTER — PATIENT MESSAGE (OUTPATIENT)
Dept: FAMILY MEDICINE | Facility: CLINIC | Age: 53
End: 2023-08-02
Payer: COMMERCIAL

## 2023-08-02 ENCOUNTER — PATIENT MESSAGE (OUTPATIENT)
Dept: FAMILY MEDICINE | Facility: CLINIC | Age: 53
End: 2023-08-02

## 2023-08-02 ENCOUNTER — TELEPHONE (OUTPATIENT)
Dept: FAMILY MEDICINE | Facility: CLINIC | Age: 53
End: 2023-08-02
Payer: COMMERCIAL

## 2023-08-02 PROBLEM — J41.1 CHRONIC BRONCHITIS WITH PRODUCTIVE MUCOPURULENT COUGH: Status: ACTIVE | Noted: 2023-08-02

## 2023-08-02 NOTE — TELEPHONE ENCOUNTER
Yes please. I just closed her chart, and there is documentation of both her obesity, htn and shortness of breath.

## 2023-08-03 ENCOUNTER — PATIENT MESSAGE (OUTPATIENT)
Dept: FAMILY MEDICINE | Facility: CLINIC | Age: 53
End: 2023-08-03

## 2023-08-03 DIAGNOSIS — E66.01 SEVERE OBESITY (BMI 35.0-39.9) WITH COMORBIDITY: Primary | ICD-10-CM

## 2023-08-04 DIAGNOSIS — M25.551 BILATERAL HIP PAIN: Primary | ICD-10-CM

## 2023-08-04 DIAGNOSIS — M25.552 BILATERAL HIP PAIN: Primary | ICD-10-CM

## 2023-08-04 RX ORDER — SEMAGLUTIDE 0.5 MG/.5ML
0.5 INJECTION, SOLUTION SUBCUTANEOUS
Qty: 2 EACH | Refills: 1 | Status: SHIPPED | OUTPATIENT
Start: 2023-08-04 | End: 2023-11-01

## 2023-08-07 ENCOUNTER — HOSPITAL ENCOUNTER (OUTPATIENT)
Dept: RADIOLOGY | Facility: HOSPITAL | Age: 53
Discharge: HOME | End: 2023-08-07
Attending: ORTHOPAEDIC SURGERY
Payer: COMMERCIAL

## 2023-08-07 ENCOUNTER — OFFICE VISIT (OUTPATIENT)
Dept: ORTHOPEDICS | Facility: CLINIC | Age: 53
End: 2023-08-07
Payer: COMMERCIAL

## 2023-08-07 VITALS — BODY MASS INDEX: 35.69 KG/M2 | WEIGHT: 227.38 LBS | HEIGHT: 67 IN

## 2023-08-07 DIAGNOSIS — M65.30 TRIGGER FINGER, UNSPECIFIED FINGER, UNSPECIFIED LATERALITY: ICD-10-CM

## 2023-08-07 DIAGNOSIS — M16.12 PRIMARY OSTEOARTHRITIS OF LEFT HIP: Primary | ICD-10-CM

## 2023-08-07 DIAGNOSIS — M25.552 BILATERAL HIP PAIN: ICD-10-CM

## 2023-08-07 DIAGNOSIS — M25.551 BILATERAL HIP PAIN: ICD-10-CM

## 2023-08-07 PROCEDURE — 1160F RVW MEDS BY RX/DR IN RCRD: CPT | Mod: CPTII,S$GLB,, | Performed by: ORTHOPAEDIC SURGERY

## 2023-08-07 PROCEDURE — 73502 XR HIP WITH PELVIS WHEN PERFORMED, 2 OR 3 VIEWS LEFT: ICD-10-PCS | Mod: 26,LT,, | Performed by: RADIOLOGY

## 2023-08-07 PROCEDURE — 99204 PR OFFICE/OUTPT VISIT, NEW, LEVL IV, 45-59 MIN: ICD-10-PCS | Mod: 25,S$GLB,, | Performed by: ORTHOPAEDIC SURGERY

## 2023-08-07 PROCEDURE — 20611 LARGE JOINT ASPIRATION/INJECTION: L HIP JOINT: ICD-10-PCS | Mod: LT,S$GLB,, | Performed by: ORTHOPAEDIC SURGERY

## 2023-08-07 PROCEDURE — 99999 PR PBB SHADOW E&M-EST. PATIENT-LVL III: ICD-10-PCS | Mod: PBBFAC,,, | Performed by: ORTHOPAEDIC SURGERY

## 2023-08-07 PROCEDURE — 73502 X-RAY EXAM HIP UNI 2-3 VIEWS: CPT | Mod: 26,LT,, | Performed by: RADIOLOGY

## 2023-08-07 PROCEDURE — 73502 X-RAY EXAM HIP UNI 2-3 VIEWS: CPT | Mod: TC,PO,LT

## 2023-08-07 PROCEDURE — 1160F PR REVIEW ALL MEDS BY PRESCRIBER/CLIN PHARMACIST DOCUMENTED: ICD-10-PCS | Mod: CPTII,S$GLB,, | Performed by: ORTHOPAEDIC SURGERY

## 2023-08-07 PROCEDURE — 4010F ACE/ARB THERAPY RXD/TAKEN: CPT | Mod: CPTII,S$GLB,, | Performed by: ORTHOPAEDIC SURGERY

## 2023-08-07 PROCEDURE — 3061F PR NEG MICROALBUMINURIA RESULT DOCUMENTED/REVIEW: ICD-10-PCS | Mod: CPTII,S$GLB,, | Performed by: ORTHOPAEDIC SURGERY

## 2023-08-07 PROCEDURE — 1159F PR MEDICATION LIST DOCUMENTED IN MEDICAL RECORD: ICD-10-PCS | Mod: CPTII,S$GLB,, | Performed by: ORTHOPAEDIC SURGERY

## 2023-08-07 PROCEDURE — 3066F PR DOCUMENTATION OF TREATMENT FOR NEPHROPATHY: ICD-10-PCS | Mod: CPTII,S$GLB,, | Performed by: ORTHOPAEDIC SURGERY

## 2023-08-07 PROCEDURE — 4010F PR ACE/ARB THEARPY RXD/TAKEN: ICD-10-PCS | Mod: CPTII,S$GLB,, | Performed by: ORTHOPAEDIC SURGERY

## 2023-08-07 PROCEDURE — 20611 DRAIN/INJ JOINT/BURSA W/US: CPT | Mod: LT,S$GLB,, | Performed by: ORTHOPAEDIC SURGERY

## 2023-08-07 PROCEDURE — 99204 OFFICE O/P NEW MOD 45 MIN: CPT | Mod: 25,S$GLB,, | Performed by: ORTHOPAEDIC SURGERY

## 2023-08-07 PROCEDURE — 1159F MED LIST DOCD IN RCRD: CPT | Mod: CPTII,S$GLB,, | Performed by: ORTHOPAEDIC SURGERY

## 2023-08-07 PROCEDURE — 3066F NEPHROPATHY DOC TX: CPT | Mod: CPTII,S$GLB,, | Performed by: ORTHOPAEDIC SURGERY

## 2023-08-07 PROCEDURE — 3008F BODY MASS INDEX DOCD: CPT | Mod: CPTII,S$GLB,, | Performed by: ORTHOPAEDIC SURGERY

## 2023-08-07 PROCEDURE — 99999 PR PBB SHADOW E&M-EST. PATIENT-LVL III: CPT | Mod: PBBFAC,,, | Performed by: ORTHOPAEDIC SURGERY

## 2023-08-07 PROCEDURE — 3061F NEG MICROALBUMINURIA REV: CPT | Mod: CPTII,S$GLB,, | Performed by: ORTHOPAEDIC SURGERY

## 2023-08-07 PROCEDURE — 3008F PR BODY MASS INDEX (BMI) DOCUMENTED: ICD-10-PCS | Mod: CPTII,S$GLB,, | Performed by: ORTHOPAEDIC SURGERY

## 2023-08-07 RX ORDER — METHYLPREDNISOLONE ACETATE 80 MG/ML
80 INJECTION, SUSPENSION INTRA-ARTICULAR; INTRALESIONAL; INTRAMUSCULAR; SOFT TISSUE
Status: DISCONTINUED | OUTPATIENT
Start: 2023-08-07 | End: 2023-08-07 | Stop reason: HOSPADM

## 2023-08-07 RX ADMIN — METHYLPREDNISOLONE ACETATE 80 MG: 80 INJECTION, SUSPENSION INTRA-ARTICULAR; INTRALESIONAL; INTRAMUSCULAR; SOFT TISSUE at 03:08

## 2023-08-07 NOTE — PROGRESS NOTES
CC:  53-year-old female presents for evaluation of left hip pain.  The patient states that she had a fall at work about a year ago and injured the left hip.  She was told by her primary care provider that was bursitis.  She is been taking anti-inflammatories but has continued to have pain despite using the NSAIDs.  When asked to localize her pain she actually points to her left groin.  When specifically asked she denies lateral hip pain.  She states the pain localizes deep in her groin.  She rates the pain as a 5/10.    Past Medical History:   Diagnosis Date    Essential hypertension 11/21/2022       History reviewed. No pertinent surgical history.    Current Outpatient Medications on File Prior to Visit   Medication Sig Dispense Refill    albuterol (VENTOLIN HFA) 90 mcg/actuation inhaler Inhale 2 puffs into the lungs every 6 (six) hours as needed for Wheezing. Rescue 18 g 3    benzonatate (TESSALON) 200 MG capsule Take 1 capsule (200 mg total) by mouth 3 (three) times daily as needed for Cough. 90 capsule 3    cloNIDine (CATAPRES) 0.1 MG tablet Take 1 tablet (0.1 mg total) by mouth every evening. 90 tablet 1    fluticasone propionate (FLONASE) 50 mcg/actuation nasal spray 1 spray (50 mcg total) by Each Nostril route once daily. 18.2 mL 3    hydroCHLOROthiazide (HYDRODIURIL) 25 MG tablet Take 1 tablet (25 mg total) by mouth once daily. 90 tablet 3    ibuprofen (ADVIL,MOTRIN) 600 MG tablet Take 1 tablet (600 mg total) by mouth 3 (three) times daily as needed for Pain. 90 tablet 3    losartan (COZAAR) 100 MG tablet Take 1 tablet (100 mg total) by mouth once daily. 90 tablet 3    semaglutide, weight loss, (WEGOVY) 0.5 mg/0.5 mL PnIj Inject 0.5 mg into the skin every 7 days. 2 each 1    venlafaxine (EFFEXOR-XR) 75 MG 24 hr capsule Take 1 capsule (75 mg total) by mouth once daily. 90 capsule 3     No current facility-administered medications on file prior to visit.       ROS:    Constitution: Denies chills, fever, and  sweats.  HENT: Denies headaches or blurry vision.  Cardiovascular: Denies chest pain or irregular heart beat.  Respiratory: Denies cough or shortness of breath.  Gastrointestinal: Denies abdominal pain, nausea, or vomiting.  Genitourinary:  Denies urinary incontinence, bladder and kidney issues  Musculoskeletal:  Denies muscle cramps.  Neurological: Denies dizziness or focal weakness.  Psychiatric/Behavioral: Normal mental status.  Hematologic/Lymphatic: Denies bleeding problem or easy bruising/bleeding.  Skin: Denies rash or suspicious lesions.    Physical examination     Gen - No acute distress, well nourished, well groomed   Eyes - Extraoccular motions intact, pupils equally round and reactive to light and accommodation   ENT - normocephalic, atruamtic, oropharynx clear   Neck - Supple, no abnormal masses   Cardiovascular - regular rate and rhythm   Pulmonary - clear to auscultation bilaterally, no wheezes, ronchi, or rales   Abdomen - soft, non-tender, non-distended, positive bowel sounds   Psych - The patient is alert and oriented x3 with normal mood and affect    Examination of the Left Lower Extremity    Skin is intact throughout  Motor in intact EHL,FHL,TA,samson  +2 DP/PT  Sensation LT intact D/P/1st    Examination of the Left Hip    C-Sign positive  Logroll positive  Stenchfield positive    Pain with ROM positive    ROM:    Flexion   120  Extension   30  Abduction   45  Adduction   20  External Rotation 45  Internal Rotation 35    Flexion contracture negative    FADIR positive  FADER negative    Tenderness to palpation over lateral and posterolateral greater tochanter negative    X-ray images were examined and personally interpreted by me.  Three views of the left hip dated 08/07/2023 show osteoarthritis of the left hip with narrowing of the joint space, subchondral sclerosis, and early periarticular osteophytes.    Dx:  Likely degenerative labral tear superimposed on existing osteoarthritis of the left  hip    Plan:  I discussed treatment options with the patient.  In the face of the arthritis that she has labral repairs not really an option.  We discussed injections and briefly discussed surgery.  The patient would like to proceed with ultrasound-guided injection in the left hip.    Recommendation is for an ultrasound guided steroid injection into the Left hip. Ultrasound guidance is needed due to the fact that the hip joint is deep in the body and not palpable. Risk and benefits of the procedure were explained to the patient. They verbalized understanding and did wish to proceed. Informed consent was obtained.  The Left hip was visualized under ultrasound. The femoral head, femoral neck, head-neck junction, and acetabulum were all visualized. The femoral neurovascular bundle was identified and avoided. Under direct ultrasound visualization, an 18 gauge spinal needle was advanced to an appropriate spot at the femoral head neck junction and the Left hip was injected with a mixture of 2/2/1 lidocaine, marcaine, and depomedrol. The hip capsule could be seen to distend as the fluid was injected. The patient tolerated the procedure well.  Static ultrasound images were saved to the patients chart.     Follow up in 3 weeks for re-evaluation

## 2023-08-07 NOTE — PROCEDURES
Large Joint Aspiration/Injection: L hip joint    Date/Time: 8/7/2023 3:00 PM    Performed by: Carlos Alberto Bejarano II, MD  Authorized by: Carlos Alberto Bejarano II, MD    Consent Done?:  Yes (Verbal)  Indications:  Arthritis and pain  Timeout: prior to procedure the correct patient, procedure, and site was verified      Local anesthesia used?: Yes    Local anesthetic:  Topical anesthetic    Details:  Needle Size:  22 G  Ultrasonic Guidance for needle placement?: Yes    Images are saved and documented.  Approach:  Anterolateral  Location:  Hip  Site:  L hip joint  Medications:  80 mg methylPREDNISolone acetate 80 mg/mL  Patient tolerance:  Patient tolerated the procedure well with no immediate complications

## 2023-08-08 ENCOUNTER — TELEPHONE (OUTPATIENT)
Dept: ORTHOPEDICS | Facility: CLINIC | Age: 53
End: 2023-08-08
Payer: COMMERCIAL

## 2023-08-24 ENCOUNTER — PATIENT MESSAGE (OUTPATIENT)
Dept: FAMILY MEDICINE | Facility: CLINIC | Age: 53
End: 2023-08-24

## 2023-08-24 DIAGNOSIS — I10 ESSENTIAL HYPERTENSION: Primary | ICD-10-CM

## 2023-08-26 RX ORDER — NIFEDIPINE 30 MG/1
30 TABLET, EXTENDED RELEASE ORAL DAILY
Qty: 90 TABLET | Refills: 3 | Status: SHIPPED | OUTPATIENT
Start: 2023-08-26 | End: 2023-11-27 | Stop reason: SDUPTHER

## 2023-08-28 ENCOUNTER — OFFICE VISIT (OUTPATIENT)
Dept: ORTHOPEDICS | Facility: CLINIC | Age: 53
End: 2023-08-28
Payer: COMMERCIAL

## 2023-08-28 VITALS — HEIGHT: 67 IN | BODY MASS INDEX: 35.68 KG/M2 | WEIGHT: 227.31 LBS

## 2023-08-28 DIAGNOSIS — M16.12 PRIMARY OSTEOARTHRITIS OF LEFT HIP: Primary | ICD-10-CM

## 2023-08-28 PROCEDURE — 99212 PR OFFICE/OUTPT VISIT, EST, LEVL II, 10-19 MIN: ICD-10-PCS | Mod: S$GLB,,, | Performed by: ORTHOPAEDIC SURGERY

## 2023-08-28 PROCEDURE — 4010F PR ACE/ARB THEARPY RXD/TAKEN: ICD-10-PCS | Mod: CPTII,S$GLB,, | Performed by: ORTHOPAEDIC SURGERY

## 2023-08-28 PROCEDURE — 99999 PR PBB SHADOW E&M-EST. PATIENT-LVL II: CPT | Mod: PBBFAC,,, | Performed by: ORTHOPAEDIC SURGERY

## 2023-08-28 PROCEDURE — 99212 OFFICE O/P EST SF 10 MIN: CPT | Mod: S$GLB,,, | Performed by: ORTHOPAEDIC SURGERY

## 2023-08-28 PROCEDURE — 3061F PR NEG MICROALBUMINURIA RESULT DOCUMENTED/REVIEW: ICD-10-PCS | Mod: CPTII,S$GLB,, | Performed by: ORTHOPAEDIC SURGERY

## 2023-08-28 PROCEDURE — 4010F ACE/ARB THERAPY RXD/TAKEN: CPT | Mod: CPTII,S$GLB,, | Performed by: ORTHOPAEDIC SURGERY

## 2023-08-28 PROCEDURE — 3008F BODY MASS INDEX DOCD: CPT | Mod: CPTII,S$GLB,, | Performed by: ORTHOPAEDIC SURGERY

## 2023-08-28 PROCEDURE — 1160F PR REVIEW ALL MEDS BY PRESCRIBER/CLIN PHARMACIST DOCUMENTED: ICD-10-PCS | Mod: CPTII,S$GLB,, | Performed by: ORTHOPAEDIC SURGERY

## 2023-08-28 PROCEDURE — 3066F NEPHROPATHY DOC TX: CPT | Mod: CPTII,S$GLB,, | Performed by: ORTHOPAEDIC SURGERY

## 2023-08-28 PROCEDURE — 1159F MED LIST DOCD IN RCRD: CPT | Mod: CPTII,S$GLB,, | Performed by: ORTHOPAEDIC SURGERY

## 2023-08-28 PROCEDURE — 1160F RVW MEDS BY RX/DR IN RCRD: CPT | Mod: CPTII,S$GLB,, | Performed by: ORTHOPAEDIC SURGERY

## 2023-08-28 PROCEDURE — 3008F PR BODY MASS INDEX (BMI) DOCUMENTED: ICD-10-PCS | Mod: CPTII,S$GLB,, | Performed by: ORTHOPAEDIC SURGERY

## 2023-08-28 PROCEDURE — 3066F PR DOCUMENTATION OF TREATMENT FOR NEPHROPATHY: ICD-10-PCS | Mod: CPTII,S$GLB,, | Performed by: ORTHOPAEDIC SURGERY

## 2023-08-28 PROCEDURE — 3061F NEG MICROALBUMINURIA REV: CPT | Mod: CPTII,S$GLB,, | Performed by: ORTHOPAEDIC SURGERY

## 2023-08-28 PROCEDURE — 1159F PR MEDICATION LIST DOCUMENTED IN MEDICAL RECORD: ICD-10-PCS | Mod: CPTII,S$GLB,, | Performed by: ORTHOPAEDIC SURGERY

## 2023-08-28 PROCEDURE — 99999 PR PBB SHADOW E&M-EST. PATIENT-LVL II: ICD-10-PCS | Mod: PBBFAC,,, | Performed by: ORTHOPAEDIC SURGERY

## 2023-08-28 NOTE — PROGRESS NOTES
CC:  53-year-old female follows up with her left hip.  She has osteoarthritis of the left hip and on her last visit we gave her an ultrasound-guided injection.  Date of injection was 08/07/2023.  The patient reports she is obtained excellent relief with the injection and is about 70% better than when she 1st came.    Examination of the Left Lower Extremity    Skin is intact throughout  Motor in intact EHL,FHL,TA,samson  +2 DP/PT  Sensation LT intact D/P/1st    Examination of the Left Hip    C-Sign negative  Logroll negative  Stenchfield negative    Pain with ROM negative    ROM:    Flexion   120  Extension   30  Abduction   45  Adduction   20  External Rotation 45  Internal Rotation 35    Flexion contracture negative    FADIR negative  FADER negative    Tenderness to palpation over lateral and posterolateral greater tochanter negative    Dx:  Unilateral primary osteoarthritis left hip    Plan:  I discussed with the patient that we can repeat the injection about every 3 months.  She can follow up in 2 months for re-evaluation.

## 2023-10-17 ENCOUNTER — PATIENT MESSAGE (OUTPATIENT)
Dept: ORTHOPEDICS | Facility: CLINIC | Age: 53
End: 2023-10-17
Payer: COMMERCIAL

## 2023-10-30 ENCOUNTER — OFFICE VISIT (OUTPATIENT)
Dept: ORTHOPEDICS | Facility: CLINIC | Age: 53
End: 2023-10-30
Payer: COMMERCIAL

## 2023-10-30 VITALS — HEIGHT: 67 IN | BODY MASS INDEX: 35.63 KG/M2 | WEIGHT: 227 LBS

## 2023-10-30 DIAGNOSIS — M16.12 PRIMARY OSTEOARTHRITIS OF LEFT HIP: Primary | ICD-10-CM

## 2023-10-30 PROCEDURE — 99214 PR OFFICE/OUTPT VISIT, EST, LEVL IV, 30-39 MIN: ICD-10-PCS | Mod: 25,S$GLB,, | Performed by: ORTHOPAEDIC SURGERY

## 2023-10-30 PROCEDURE — 3066F PR DOCUMENTATION OF TREATMENT FOR NEPHROPATHY: ICD-10-PCS | Mod: CPTII,S$GLB,, | Performed by: ORTHOPAEDIC SURGERY

## 2023-10-30 PROCEDURE — 1159F PR MEDICATION LIST DOCUMENTED IN MEDICAL RECORD: ICD-10-PCS | Mod: CPTII,S$GLB,, | Performed by: ORTHOPAEDIC SURGERY

## 2023-10-30 PROCEDURE — 99999 PR PBB SHADOW E&M-EST. PATIENT-LVL III: CPT | Mod: PBBFAC,,, | Performed by: ORTHOPAEDIC SURGERY

## 2023-10-30 PROCEDURE — 1160F PR REVIEW ALL MEDS BY PRESCRIBER/CLIN PHARMACIST DOCUMENTED: ICD-10-PCS | Mod: CPTII,S$GLB,, | Performed by: ORTHOPAEDIC SURGERY

## 2023-10-30 PROCEDURE — 3061F NEG MICROALBUMINURIA REV: CPT | Mod: CPTII,S$GLB,, | Performed by: ORTHOPAEDIC SURGERY

## 2023-10-30 PROCEDURE — 3061F PR NEG MICROALBUMINURIA RESULT DOCUMENTED/REVIEW: ICD-10-PCS | Mod: CPTII,S$GLB,, | Performed by: ORTHOPAEDIC SURGERY

## 2023-10-30 PROCEDURE — 4010F ACE/ARB THERAPY RXD/TAKEN: CPT | Mod: CPTII,S$GLB,, | Performed by: ORTHOPAEDIC SURGERY

## 2023-10-30 PROCEDURE — 20611 LARGE JOINT ASPIRATION/INJECTION: L HIP JOINT: ICD-10-PCS | Mod: LT,S$GLB,, | Performed by: ORTHOPAEDIC SURGERY

## 2023-10-30 PROCEDURE — 4010F PR ACE/ARB THEARPY RXD/TAKEN: ICD-10-PCS | Mod: CPTII,S$GLB,, | Performed by: ORTHOPAEDIC SURGERY

## 2023-10-30 PROCEDURE — 1159F MED LIST DOCD IN RCRD: CPT | Mod: CPTII,S$GLB,, | Performed by: ORTHOPAEDIC SURGERY

## 2023-10-30 PROCEDURE — 99214 OFFICE O/P EST MOD 30 MIN: CPT | Mod: 25,S$GLB,, | Performed by: ORTHOPAEDIC SURGERY

## 2023-10-30 PROCEDURE — 3066F NEPHROPATHY DOC TX: CPT | Mod: CPTII,S$GLB,, | Performed by: ORTHOPAEDIC SURGERY

## 2023-10-30 PROCEDURE — 3008F BODY MASS INDEX DOCD: CPT | Mod: CPTII,S$GLB,, | Performed by: ORTHOPAEDIC SURGERY

## 2023-10-30 PROCEDURE — 1160F RVW MEDS BY RX/DR IN RCRD: CPT | Mod: CPTII,S$GLB,, | Performed by: ORTHOPAEDIC SURGERY

## 2023-10-30 PROCEDURE — 20611 DRAIN/INJ JOINT/BURSA W/US: CPT | Mod: LT,S$GLB,, | Performed by: ORTHOPAEDIC SURGERY

## 2023-10-30 PROCEDURE — 99999 PR PBB SHADOW E&M-EST. PATIENT-LVL III: ICD-10-PCS | Mod: PBBFAC,,, | Performed by: ORTHOPAEDIC SURGERY

## 2023-10-30 PROCEDURE — 3008F PR BODY MASS INDEX (BMI) DOCUMENTED: ICD-10-PCS | Mod: CPTII,S$GLB,, | Performed by: ORTHOPAEDIC SURGERY

## 2023-10-30 RX ORDER — METHYLPREDNISOLONE ACETATE 80 MG/ML
80 INJECTION, SUSPENSION INTRA-ARTICULAR; INTRALESIONAL; INTRAMUSCULAR; SOFT TISSUE
Status: DISCONTINUED | OUTPATIENT
Start: 2023-10-30 | End: 2023-10-30 | Stop reason: HOSPADM

## 2023-10-30 RX ADMIN — METHYLPREDNISOLONE ACETATE 80 MG: 80 INJECTION, SUSPENSION INTRA-ARTICULAR; INTRALESIONAL; INTRAMUSCULAR; SOFT TISSUE at 01:10

## 2023-10-30 NOTE — PROGRESS NOTES
CC:  53-year-old female follows up with osteoarthritis of her left hip.  She received an ultrasound-guided injection on 08/07/2023.  The patient did get good relief with the injection but it is starting to wear off.  She is begun having intermittent pain that at its worse as a 5/10.    Past Medical History:   Diagnosis Date    Essential hypertension 11/21/2022       No past surgical history on file.  Current Outpatient Medications on File Prior to Visit   Medication Sig Dispense Refill    albuterol (VENTOLIN HFA) 90 mcg/actuation inhaler Inhale 2 puffs into the lungs every 6 (six) hours as needed for Wheezing. Rescue 18 g 3    cloNIDine (CATAPRES) 0.1 MG tablet Take 1 tablet (0.1 mg total) by mouth every evening. 90 tablet 1    fluticasone propionate (FLONASE) 50 mcg/actuation nasal spray 1 spray (50 mcg total) by Each Nostril route once daily. 18.2 mL 3    hydroCHLOROthiazide (HYDRODIURIL) 25 MG tablet Take 1 tablet (25 mg total) by mouth once daily. 90 tablet 3    ibuprofen (ADVIL,MOTRIN) 600 MG tablet Take 1 tablet (600 mg total) by mouth 3 (three) times daily as needed for Pain. 90 tablet 3    losartan (COZAAR) 100 MG tablet Take 1 tablet (100 mg total) by mouth once daily. 90 tablet 3    NIFEdipine (PROCARDIA-XL) 30 MG (OSM) 24 hr tablet Take 1 tablet (30 mg total) by mouth once daily. 90 tablet 3    semaglutide, weight loss, (WEGOVY) 0.5 mg/0.5 mL PnIj Inject 0.5 mg into the skin every 7 days. 2 each 1    venlafaxine (EFFEXOR-XR) 75 MG 24 hr capsule Take 1 capsule (75 mg total) by mouth once daily. 90 capsule 3     No current facility-administered medications on file prior to visit.       ROS:    Constitution: Denies chills, fever, and sweats.  HENT: Denies headaches or blurry vision.  Cardiovascular: Denies chest pain or irregular heart beat.  Respiratory: Denies cough or shortness of breath.  Gastrointestinal: Denies abdominal pain, nausea, or vomiting.  Genitourinary:  Denies urinary incontinence, bladder  and kidney issues  Musculoskeletal:  Denies muscle cramps.  Positive for left hip pain  Neurological: Denies dizziness or focal weakness.  Psychiatric/Behavioral: Normal mental status.  Hematologic/Lymphatic: Denies bleeding problem or easy bruising/bleeding.  Skin: Denies rash or suspicious lesions.    Physical examination     Gen - No acute distress, well nourished, well groomed   Eyes - Extraoccular motions intact, pupils equally round and reactive to light and accommodation   ENT - normocephalic, atruamtic, oropharynx clear   Neck - Supple, no abnormal masses   Cardiovascular - regular rate and rhythm   Pulmonary - clear to auscultation bilaterally, no wheezes, ronchi, or rales   Abdomen - soft, non-tender, non-distended, positive bowel sounds   Psych - The patient is alert and oriented x3 with normal mood and affect    Examination of the Left Lower Extremity    Skin is intact throughout  Motor in intact EHL,FHL,TA,samson  +2 DP/PT  Sensation LT intact D/P/1st    Examination of the Left Hip    C-Sign positive  Logroll negative  Stenchfield positive    Pain with ROM positive    ROM:    Flexion   120  Extension   30  Abduction   45  Adduction   20  External Rotation 45  Internal Rotation 35    Flexion contracture negative    FADIR positive  FADER negative    Tenderness to palpation over lateral and posterolateral greater tochanter positive    X-ray images were examined and personally interpreted by me.  Three views left hip dated 08/07/2023 were once again reviewed.  There is osteoarthritis of the left hip with narrowing of the joint space and subchondral sclerosis.    Dx:  Osteoarthritis left hip    Plan:  Recommendation is for an ultrasound guided steroid injection into the Left hip. Ultrasound guidance is needed due to the fact that the hip joint is deep in the body and not palpable. Risk and benefits of the procedure were explained to the patient. They verbalized understanding and did wish to proceed. Informed  consent was obtained.  The Left hip was visualized under ultrasound. The femoral head, femoral neck, head-neck junction, and acetabulum were all visualized. The femoral neurovascular bundle was identified and avoided. Under direct ultrasound visualization, an 18 gauge spinal needle was advanced to an appropriate spot at the femoral head neck junction and the Left hip was injected with a mixture of 2/2/1 lidocaine, marcaine, and depomedrol. The hip capsule could be seen to distend as the fluid was injected. The patient tolerated the procedure well.  Static ultrasound images were saved to the patients chart.

## 2023-10-30 NOTE — PROCEDURES
Large Joint Aspiration/Injection: L hip joint    Date/Time: 10/30/2023 1:45 PM    Performed by: Carlos Alberto Bejarano II, MD  Authorized by: Carlos Alberto Bejarano II, MD    Consent Done?:  Yes (Verbal)  Indications:  Arthritis and pain  Timeout: prior to procedure the correct patient, procedure, and site was verified      Details:  Needle Size:  22 G  Ultrasonic Guidance for needle placement?: Yes    Images are saved and documented.  Approach:  Anterolateral  Location:  Hip  Site:  L hip joint  Medications:  80 mg methylPREDNISolone acetate 80 mg/mL  Patient tolerance:  Patient tolerated the procedure well with no immediate complications

## 2023-11-01 ENCOUNTER — OFFICE VISIT (OUTPATIENT)
Dept: FAMILY MEDICINE | Facility: CLINIC | Age: 53
End: 2023-11-01
Payer: COMMERCIAL

## 2023-11-01 ENCOUNTER — TELEPHONE (OUTPATIENT)
Dept: FAMILY MEDICINE | Facility: CLINIC | Age: 53
End: 2023-11-01

## 2023-11-01 VITALS
WEIGHT: 224.63 LBS | SYSTOLIC BLOOD PRESSURE: 142 MMHG | OXYGEN SATURATION: 95 % | HEART RATE: 106 BPM | BODY MASS INDEX: 35.26 KG/M2 | HEIGHT: 67 IN | DIASTOLIC BLOOD PRESSURE: 104 MMHG

## 2023-11-01 DIAGNOSIS — F17.200 CURRENT SMOKER: ICD-10-CM

## 2023-11-01 DIAGNOSIS — N95.1 MENOPAUSAL SYMPTOMS: ICD-10-CM

## 2023-11-01 DIAGNOSIS — I10 ESSENTIAL HYPERTENSION: Primary | ICD-10-CM

## 2023-11-01 DIAGNOSIS — L98.9 SKIN LESION: ICD-10-CM

## 2023-11-01 DIAGNOSIS — M70.72 BURSITIS OF LEFT HIP, UNSPECIFIED BURSA: ICD-10-CM

## 2023-11-01 DIAGNOSIS — Z87.891 PERSONAL HISTORY OF NICOTINE DEPENDENCE: ICD-10-CM

## 2023-11-01 PROCEDURE — 3077F SYST BP >= 140 MM HG: CPT | Mod: CPTII,S$GLB,,

## 2023-11-01 PROCEDURE — 99214 OFFICE O/P EST MOD 30 MIN: CPT | Mod: S$GLB,,,

## 2023-11-01 PROCEDURE — 4010F PR ACE/ARB THEARPY RXD/TAKEN: ICD-10-PCS | Mod: CPTII,S$GLB,,

## 2023-11-01 PROCEDURE — 3061F NEG MICROALBUMINURIA REV: CPT | Mod: CPTII,S$GLB,,

## 2023-11-01 PROCEDURE — 3008F PR BODY MASS INDEX (BMI) DOCUMENTED: ICD-10-PCS | Mod: CPTII,S$GLB,,

## 2023-11-01 PROCEDURE — 3080F DIAST BP >= 90 MM HG: CPT | Mod: CPTII,S$GLB,,

## 2023-11-01 PROCEDURE — 3008F BODY MASS INDEX DOCD: CPT | Mod: CPTII,S$GLB,,

## 2023-11-01 PROCEDURE — 4010F ACE/ARB THERAPY RXD/TAKEN: CPT | Mod: CPTII,S$GLB,,

## 2023-11-01 PROCEDURE — 99214 PR OFFICE/OUTPT VISIT, EST, LEVL IV, 30-39 MIN: ICD-10-PCS | Mod: S$GLB,,,

## 2023-11-01 PROCEDURE — 3080F PR MOST RECENT DIASTOLIC BLOOD PRESSURE >= 90 MM HG: ICD-10-PCS | Mod: CPTII,S$GLB,,

## 2023-11-01 PROCEDURE — 3066F PR DOCUMENTATION OF TREATMENT FOR NEPHROPATHY: ICD-10-PCS | Mod: CPTII,S$GLB,,

## 2023-11-01 PROCEDURE — 3077F PR MOST RECENT SYSTOLIC BLOOD PRESSURE >= 140 MM HG: ICD-10-PCS | Mod: CPTII,S$GLB,,

## 2023-11-01 PROCEDURE — 3061F PR NEG MICROALBUMINURIA RESULT DOCUMENTED/REVIEW: ICD-10-PCS | Mod: CPTII,S$GLB,,

## 2023-11-01 PROCEDURE — 3066F NEPHROPATHY DOC TX: CPT | Mod: CPTII,S$GLB,,

## 2023-11-01 RX ORDER — VARENICLINE TARTRATE 1 MG/1
1 TABLET, FILM COATED ORAL 2 TIMES DAILY
COMMUNITY
End: 2024-01-04

## 2023-11-01 RX ORDER — CLONIDINE HYDROCHLORIDE 0.1 MG/1
0.1 TABLET ORAL NIGHTLY
Qty: 90 TABLET | Refills: 3 | Status: SHIPPED | OUTPATIENT
Start: 2023-11-01 | End: 2024-10-31

## 2023-11-01 RX ORDER — OLMESARTAN MEDOXOMIL 40 MG/1
40 TABLET ORAL DAILY
Qty: 90 TABLET | Refills: 3 | Status: SHIPPED | OUTPATIENT
Start: 2023-11-01 | End: 2023-11-27 | Stop reason: SDUPTHER

## 2023-11-01 RX ORDER — IBUPROFEN 600 MG/1
600 TABLET ORAL 3 TIMES DAILY PRN
Qty: 90 TABLET | Refills: 3
Start: 2023-11-01 | End: 2024-01-04

## 2023-11-01 NOTE — PROGRESS NOTES
"  SUBJECTIVE:    Patient ID: Nohelia Turk is a 53 y.o. female.    Chief Complaint: Follow-up (No bottles//Pt here for 3mo follow up//Discuss BP meds//declines flu vacc//JL)    53-year-old established female patient here for regular checkup.     We will pressure not well-controlled.  Previous visit we did change amlodipine to nifedipine because patient was complaining of edema in her legs.  We also  her hydrochlorothiazide from her ARB because she was complaining that the hydrochlorothiazide was "drying her out and giving her leg cramps." She has not been taking the hydrochlorothiazide every day, in fact she is been taking very rarely.  Discussed today that 30 mg of extended release nifedipine is not enough alone to manage her blood pressure.  Patient agrees and states that her blood pressures have been running high at home.  She states that her bottom number was running in the 90s until about a month ago when it started running over 100 again.    She has also been having continued pain in her left hip.  She is been seeing Dr. Bejarano.  She was advised that she needs a hip replacement.  She has been getting cortisone injections.  Continues to take prescription strength ibuprofen.      Has been unable to find Wegovy.  Has been trying diet and exercise on her own.      Productive cough has gotten better.  Patient has enrolled in the smoking cessation program and is on Chantix.  Patient is doing well.  She states she is down from 1 pack a day to half a pack a day.  Patient does have a greater than 30 pack year smoking history and we did discuss low-dose CT screening of her lungs.  She would like to do this.  She states just use her albuterol inhaler approximately once every 2 weeks.    Patient is due for mammogram.  She understands that she is had standing orders.  Still needs to schedule appointment.    Discussed Shingrix vaccination with patient.  She states she has never had chickenpox so she does not " feel like she really needs to vaccination.    Does not want other vaccinations at this time.    Also discussed routine screening colonoscopies.  Patient had colonoscopy in 2019 and did have a polyp.  We will get the report to see if they had recommendations of having screenings more frequently than every 10 years due to her polyp and her family history of colon cancer.    Sees Dr. Viera for eye exams.  Had an eye exam this year.    Discussed cardiac risk below:    The 10-year CVD risk score (VERENICE'Agoino, et al., 2008) is: 17.5%    Values used to calculate the score:      Age: 53 years      Sex: Female      Diabetic: No      Tobacco smoker: Yes      Systolic Blood Pressure: 142 mmHg      Is BP treated: Yes      HDL Cholesterol: 53 mg/dL      Total Cholesterol: 213 mg/dL     Follow-up  Associated symptoms include arthralgias and coughing. Pertinent negatives include no abdominal pain, chest pain, chills, congestion, fatigue, fever, headaches, myalgias, nausea, numbness, rash, sore throat, vomiting or weakness.       No visits with results within 6 Month(s) from this visit.   Latest known visit with results is:   Office Visit on 11/21/2022   Component Date Value Ref Range Status    WBC 01/25/2023 9.0  3.8 - 10.8 Thousand/uL Final    RBC 01/25/2023 4.76  3.80 - 5.10 Million/uL Final    Hemoglobin 01/25/2023 15.2  11.7 - 15.5 g/dL Final    Hematocrit 01/25/2023 44.7  35.0 - 45.0 % Final    MCV 01/25/2023 93.9  80.0 - 100.0 fL Final    MCH 01/25/2023 31.9  27.0 - 33.0 pg Final    MCHC 01/25/2023 34.0  32.0 - 36.0 g/dL Final    RDW 01/25/2023 13.2  11.0 - 15.0 % Final    Platelets 01/25/2023 347  140 - 400 Thousand/uL Final    MPV 01/25/2023 10.6  7.5 - 12.5 fL Final    Neutrophils, Abs 01/25/2023 4,959  1,500 - 7,800 cells/uL Final    Lymph # 01/25/2023 2,619  850 - 3,900 cells/uL Final    Mono # 01/25/2023 1,206 (H)  200 - 950 cells/uL Final    Eos # 01/25/2023 144  15 - 500 cells/uL Final    Baso # 01/25/2023 72  0  - 200 cells/uL Final    Neutrophils Relative 01/25/2023 55.1  % Final    Lymph % 01/25/2023 29.1  % Final    Mono % 01/25/2023 13.4  % Final    Eosinophil % 01/25/2023 1.6  % Final    Basophil % 01/25/2023 0.8  % Final    Creatinine, Urine 01/25/2023 158  20 - 275 mg/dL Final    Microalb, Ur 01/25/2023 1.8  See Note: mg/dL Final    Microalb/Creat Ratio 01/25/2023 11  <30 mcg/mg creat Final    Cholesterol 01/25/2023 213 (H)  <200 mg/dL Final    HDL 01/25/2023 53  > OR = 50 mg/dL Final    Triglycerides 01/25/2023 106  <150 mg/dL Final    LDL Cholesterol 01/25/2023 138 (H)  mg/dL (calc) Final    HDL/Cholesterol Ratio 01/25/2023 4.0  <5.0 (calc) Final    Non HDL Chol. (LDL+VLDL) 01/25/2023 160 (H)  <130 mg/dL (calc) Final    Glucose 01/25/2023 116 (H)  65 - 99 mg/dL Final    BUN 01/25/2023 17  7 - 25 mg/dL Final    Creatinine 01/25/2023 0.78  0.50 - 1.03 mg/dL Final    eGFR 01/25/2023 90  > OR = 60 mL/min/1.73m2 Final    BUN/Creatinine Ratio 01/25/2023 NOT APPLICABLE  6 - 22 (calc) Final    Sodium 01/25/2023 139  135 - 146 mmol/L Final    Potassium 01/25/2023 4.4  3.5 - 5.3 mmol/L Final    Chloride 01/25/2023 99  98 - 110 mmol/L Final    CO2 01/25/2023 35 (H)  20 - 32 mmol/L Final    Calcium 01/25/2023 9.0  8.6 - 10.4 mg/dL Final    Total Protein 01/25/2023 7.1  6.1 - 8.1 g/dL Final    Albumin 01/25/2023 4.2  3.6 - 5.1 g/dL Final    Globulin, Total 01/25/2023 2.9  1.9 - 3.7 g/dL (calc) Final    Albumin/Globulin Ratio 01/25/2023 1.4  1.0 - 2.5 (calc) Final    Total Bilirubin 01/25/2023 0.5  0.2 - 1.2 mg/dL Final    Alkaline Phosphatase 01/25/2023 86  37 - 153 U/L Final    AST 01/25/2023 21  10 - 35 U/L Final    ALT 01/25/2023 25  6 - 29 U/L Final       Past Medical History:   Diagnosis Date    Essential hypertension 11/21/2022     Social History     Socioeconomic History    Marital status:    Tobacco Use    Smoking status: Every Day     Current packs/day: 1.00     Average packs/day: 1 pack/day for 36.8 years (36.8  ttl pk-yrs)     Types: Cigarettes     Start date: 1983     Last attempt to quit: 1/1/2013    Smokeless tobacco: Never   Substance and Sexual Activity    Alcohol use: Yes     Comment: periodically at dinner    Drug use: Never   Social History Narrative    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 No pertinent surgical history.  Family History   Problem Relation Age of Onset    Hypertension Mother     Heart disease Mother 74        CHF    Osteoporosis Mother     Cancer Father 73        colon & prostate    Diabetes Father     Hypertension Father        Review of patient's allergies indicates:  No Known Allergies    Current Outpatient Medications:     albuterol (VENTOLIN HFA) 90 mcg/actuation inhaler, Inhale 2 puffs into the lungs every 6 (six) hours as needed for Wheezing. Rescue, Disp: 18 g, Rfl: 3    fluticasone propionate (FLONASE) 50 mcg/actuation nasal spray, 1 spray (50 mcg total) by Each Nostril route once daily., Disp: 18.2 mL, Rfl: 3    varenicline (CHANTIX) 1 mg Tab, Take 1 mg by mouth 2 (two) times daily., Disp: , Rfl:     cloNIDine (CATAPRES) 0.1 MG tablet, Take 1 tablet (0.1 mg total) by mouth every evening., Disp: 90 tablet, Rfl: 3    hydroCHLOROthiazide (HYDRODIURIL) 25 MG tablet, Take 1 tablet (25 mg total) by mouth once daily., Disp: 90 tablet, Rfl: 3    ibuprofen (ADVIL,MOTRIN) 600 MG tablet, Take 1 tablet (600 mg total) by mouth 3 (three) times daily as needed for Pain., Disp: 90 tablet, Rfl: 3    NIFEdipine (PROCARDIA-XL) 30 MG (OSM) 24 hr tablet, Take 1 tablet (30 mg total) by mouth once daily., Disp: 90 tablet, Rfl: 3    olmesartan (BENICAR) 40 MG tablet, Take 1 tablet (40 mg total) by mouth once daily., Disp: 90 tablet, Rfl: 3    venlafaxine (EFFEXOR-XR) 75 MG 24 hr capsule, Take 1 capsule (75 mg total) by mouth once  "daily., Disp: 90 capsule, Rfl: 3    Review of Systems   Constitutional:  Negative for activity change, appetite change, chills, fatigue, fever and unexpected weight change.   HENT:  Negative for nasal congestion, ear pain, postnasal drip, rhinorrhea, sore throat and trouble swallowing.    Eyes:  Negative for discharge and visual disturbance.   Respiratory:  Positive for cough. Negative for apnea, shortness of breath and wheezing.    Cardiovascular:  Negative for chest pain, palpitations and leg swelling.   Gastrointestinal:  Negative for abdominal pain, blood in stool, constipation, diarrhea, nausea, vomiting and reflux.   Genitourinary:  Negative for bladder incontinence, dysuria, frequency and urgency.   Musculoskeletal:  Positive for arthralgias and gait problem. Negative for leg pain and myalgias.        Left hip pain. Limping upon initiating ambulation after being stationary for any period of time. Reports that pain feels "internal" like in her groin.   Integumentary:  Negative for rash and mole/lesion.   Neurological:  Negative for dizziness, tremors, weakness, light-headedness, numbness and headaches.   Hematological:  Does not bruise/bleed easily.   Psychiatric/Behavioral:  Negative for dysphoric mood, sleep disturbance and suicidal ideas. The patient is not nervous/anxious.            Objective:      Vitals:    11/01/23 1510 11/01/23 1520   BP: (!) 152/104 (!) 142/104   Pulse: 106    SpO2: 95%    Weight: 101.9 kg (224 lb 9.6 oz)    Height: 5' 7" (1.702 m)      Physical Exam  Vitals and nursing note reviewed.   Constitutional:       General: She is not in acute distress.     Appearance: Normal appearance. She is well-developed and overweight. She is not ill-appearing.   HENT:      Head: Normocephalic and atraumatic.      Right Ear: External ear normal.      Left Ear: External ear normal.      Nose: Nose normal.      Mouth/Throat:      Mouth: Mucous membranes are moist.      Pharynx: Oropharynx is clear. " "  Eyes:      General: No scleral icterus.     Pupils: Pupils are equal, round, and reactive to light.   Neck:      Thyroid: No thyromegaly.      Vascular: No carotid bruit.   Cardiovascular:      Rate and Rhythm: Normal rate and regular rhythm.      Pulses: Normal pulses.      Heart sounds: Normal heart sounds. No murmur heard.  Pulmonary:      Effort: Pulmonary effort is normal.      Breath sounds: No wheezing, rhonchi or rales.      Comments: Coarse in bases bilaterally  Abdominal:      General: Bowel sounds are normal.      Palpations: Abdomen is soft.   Musculoskeletal:         General: No tenderness. Normal range of motion.      Cervical back: Normal range of motion.      Lumbar back: Normal. No spasms.      Right lower leg: No edema.      Left lower leg: No edema.      Comments: Good ROM throughout. No TTP over trochanteric bursa or sciatic nerve. Patient describes pain as being "inside" her hip, with referral to inguinal region.   Skin:     General: Skin is warm and dry.      Capillary Refill: Capillary refill takes less than 2 seconds.      Coloration: Skin is not jaundiced.      Findings: No rash.          Neurological:      General: No focal deficit present.      Mental Status: She is alert and oriented to person, place, and time.      Cranial Nerves: No cranial nerve deficit.      Motor: No weakness.      Coordination: Coordination normal.      Gait: Gait abnormal.      Comments: When first standing has a bit of a limp due to left hip pain.   Psychiatric:         Mood and Affect: Mood normal.         Behavior: Behavior normal. Behavior is cooperative.         Thought Content: Thought content normal.         Judgment: Judgment normal.           Assessment:       1. Essential hypertension    2. Bursitis of left hip, unspecified bursa    3. Menopausal symptoms    4. Personal history of nicotine dependence    5. Current smoker    6. Skin lesion         Plan:       Essential hypertension  Adding olmesartan and " discontinuing losartan that her bottom number was controlled with olmesartan when she was taking this with the hydrochlorothiazide.  I did advise patient that it may have been the hydrochlorothiazide that was controlling her pressure.  Patient really does not want to take diuretic daily.  Told her that we can try the nifedipine and the olmesartan together and see how this works.  We will check pressure at home and will notify me sooner rather than later if her blood pressure remains greater than 130 or greater than 80 consistently.  -     olmesartan (BENICAR) 40 MG tablet; Take 1 tablet (40 mg total) by mouth once daily.  Dispense: 90 tablet; Refill: 3    Bursitis of left hip, unspecified bursa  Being treated by Orthopedics  -     ibuprofen (ADVIL,MOTRIN) 600 MG tablet; Take 1 tablet (600 mg total) by mouth 3 (three) times daily as needed for Pain.  Dispense: 90 tablet; Refill: 3    Menopausal symptoms  Patient states this helps her rest and controls her menopausal symptoms well.  Continue as is  -     cloNIDine (CATAPRES) 0.1 MG tablet; Take 1 tablet (0.1 mg total) by mouth every evening.  Dispense: 90 tablet; Refill: 3    Personal history of nicotine dependence  We discussed this screening test and patient does agree with plan of care  -     CT Chest Lung Screening Low Dose; Future; Expected date: 11/01/2023    Current smoker  -     CT Chest Lung Screening Low Dose; Future; Expected date: 11/01/2023    Skin lesion  Patient states she just noted this dot show up recently can not remember when.  -     Ambulatory referral/consult to Dermatology; Future; Expected date: 11/08/2023    We will call with mammogram and CT results  Follow up in about 3 months (around 2/1/2024).        11/1/2023 Hannah Garcia

## 2023-11-02 ENCOUNTER — PATIENT OUTREACH (OUTPATIENT)
Dept: ADMINISTRATIVE | Facility: HOSPITAL | Age: 53
End: 2023-11-02
Payer: COMMERCIAL

## 2023-11-02 NOTE — LETTER
AUTHORIZATION FOR RELEASE OF   CONFIDENTIAL INFORMATION    Dear Dr. Peri Mitchell,    We are seeing Nohelia Turk, date of birth 1970, in the clinic at [address]. Tu Hills MD is the patient's PCP. Nohelia Turk has an outstanding lab/procedure at the time we reviewed her chart. In order to help keep her health information updated, she has authorized us to request the following medical record(s):                                             ( X )  COLONOSCOPY ( 2019 Or Most Recent )       Please fax records to [facility], [place], Chequita S, MD, [phone]     If you have any questions, please contact     [facility]  Clinical Care Coordinator    [facility] / [facility]  [phone] (Phone)  [phone] (Fax)    Patient Name: Nohelia Turk  : 1970  Patient Phone #: [phone]                    Nohelia Turk  MRN: [id number]  : 1970  Age: 53 y.o.  Sex: female         Patient/Legal Guardian Signature  This signature was collected at 2023    Nohelia Turk     Self  _______________________________   Printed Name/Relationship to Patient      Consent for Examination and Treatment: I hereby authorize the providers and employees of [facility] ([facility]) to provide medical treatment/services which includes, but is not limited to, performing and administering tests and diagnostic procedures that are deemed necessary, including, but not limited to, imaging examinations, blood tests and other laboratory procedures as may be required by the hospital, clinic, or may be ordered by my physician(s) or persons working under the general and/or special instructions of my physician(s).      I understand and agree that this consent covers all authorized persons, including but not limited to physicians, residents, nurse practitioners, physicians' assistants, specialists, consultants, student nurses, and independently  contracted physicians, who are called upon by the physician in charge, to carry out the diagnostic procedures and medical or surgical treatment.     I hereby authorize [facility] to retain or dispose of any specimens or tissue, should there be such remaining from any test or procedure.     I hereby authorize and give consent for [facility] providers and employees to take photographs, images or videotapes of such diagnostic, surgical or treatment procedures of Patient as may be required by [facility] or as may be ordered by a physician. I further acknowledge and agree that [facility] may use cameras or other devices for patient monitoring.     I am aware that the practice of medicine is not an exact science, and I acknowledge that no guarantees have been made to me as to the outcome of any tests, procedures or treatment.     Authorization for Release of Information: I understand that my insurance company and/or their agents may need information necessary to make determinations about payment/reimbursement. I hereby provide authorization to release to all insurance companies, their successors, assignees, other parties with whom they may have contracted, or others acting on their behalf, that are involved with payment for any hospital and/or clinic charges incurred by the patient, any information that they request and deem necessary for payment/reimbursement, and/or quality review.  I further authorize the release of my health information to physicians or other health care practitioners on staff who are involved in my health care now and in the future, and to other health care providers, entities, or institutions for the purpose of my continued care and treatment, including referrals.     REGISTRATION AUTHORIZATION  Form No. 08430 (Rev. 7/13/2022)       Medicare Patient's Certification and Authorization to Release Information and Payment Request:  I certify that the information given by me in applying for payment under Title XVIII  of the Social Security Act is correct. I authorize any tejeda of medical or other information about me to release to the Social SecurityAdministration, or its intermediaries or carriers, any information needed for this or a related Medicare claim. I request that payment of authorized benefits be made on my behalf.     Assignment of Insurance Benefits:   I hereby authorize any and all insurance companies, health plans, defined   benefit plans, health insurers or any entity that is or may be responsible for payment of my medical expenses to pay all hospital and medical benefits now due, and to become due and payable to me under any hospital benefits, sick benefits, injury benefits or any other benefit for services rendered to me, including Major Medical Benefits, direct to [facility] and all independently contracted physicians. I assign any and all rights that I may have against any and all insurance companies, health plans, defined benefit plans, health insurers or any entity that is or may be responsible for payment of my medical expenses, including, but not limited to any right to appeal a denial of a claim, any right to bring any action, lawsuit, administrative proceeding, or other cause of action on my behalf. I specifically assign my right to pursue litigation against any and all insurance companies, health plans, defined benefit plans, health insurers or any entity that is or may be responsible for payment of my medical expenses based upon a refusal to pay charges.            E. Valuables: It is understood and agreed that [facility] is not liable for the damage to or loss of any money, jewelry,   documents, dentures, eye glasses, hearing aids, prosthetics, or other property of value.     F. Computer Equipment: I understand and agree that should I choose to use computer equipment owned by [facility] or if I choose to access the Internet via [facility]s network, I do so at my own risk. [facility] is not responsible for any  damage to my computer equipment or to any damages of any type that might arise from my loss of equipment or data.     G. Acceptance of Financial Responsibility:  I agree that in consideration of the services and   supplies that have been   or will be furnished to the patient, I am hereby obligated to pay all charges made for or on the account of the patient according to the standard rates (in effect at the time the services and supplies are delivered) established by [facility], including its Patient Financial Assistance Policy to the extent it is applicable. I understand that I am responsible for all charges, or portions thereof, not covered by insurance or other sources. Patient refunds will be distributed only after balances at all [facility] facilities are paid.     H. Communication Authorization:  I hereby authorize [facility] and its representatives, along with any billing service   or  who may work on their behalf, to contact me on   my cell phone and/or home phone using pre- recorded messages, artificial voice messages, automatic telephone dialing devices or other computer assisted technology, or by electronic      mail, text messaging, or by any other form of electronic communication. This includes, but is not limited to, appointment reminders, yearly physical exam reminders, preventive care reminders, patient campaigns, welcome calls, and calls about account balances on my account or any account on which I am listed as a guarantor. I understand I have the right to opt out of these communications at any time.      Relationship  Between  Facility and  Provider:      I understand that some, but not all, providers furnishing services to the patient are not employees or agents of [facility]. The patient is under the care and supervision of his/her attending physician, and it is the responsibility of the facility and its nursing staff to carry out the instructions of such physicians. It is the responsibility  of the patient's physician/designee to obtain the patient's informed consent, when required, for medical or surgical treatment, special diagnostic or therapeutic procedures, or hospital services rendered for the patient under the special instructions of the physician/designee.     REGISTRATION AUTHORIZATION  Form No. 78100 (Rev. 7/13/2022)      Notice of Privacy Practices: I acknowledge I have received a copy of [facility]'s Notice of Privacy Practices.     Facility  Directory: I have discussed with the organization my desire to be either included or excluded  in the facility directory in the event of my being an inpatient at an [facility] facility. I understand that if my choice is to opt-out of being identified in the facility directory that the facility will not provide any information about me such as my condition (e.g. fair, stable, etc.) or my location in the facility (e.g., room number, department).     Immunizations: [facility] shares immunization information with state sponsored health departments to help you and your doctor keep track of your immunization records. By signing, you consent to have this information shared with the health department in your state:                                [place] - LINKS ([place] Immunization Network for Kids Statewide)                                [place] - MIIX ([place] Immunization Information eXchange)                                [place] - ImmPRINT (Immunization Patient Registry with Integrated Technology)     TERM: This authorization is valid for this and subsequent care/treatment I receive at [facility] and will remain valid unless/until revoked in writing by me.     [facility]: As used in this document, [facility] means all [facility] owned and managed facilities, including, but not limited to, all health centers, surgery centers, clinics, urgent care centers, and hospitals.         [facility] complies with applicable Federal civil  rights laws and does not discriminate on the basis of race, color, national origin, age, disability, or sex.  ATENCIÓN: si habla demetrio, tiene a clement disposición servicios gratuitos de asistencia lingüística. Mahamed al [phone].  CHÚ Ý: N?u b?n nói Ti?ng Vi?t, có các d?ch v? h? tr? ngôn ng? mi?n phí dành cho b?n. G?i s? [phone].        REGISTRATION AUTHORIZATION  Form No. 23168 (Rev. 7/13/2022)

## 2023-11-02 NOTE — PROGRESS NOTES

## 2023-11-07 ENCOUNTER — PATIENT MESSAGE (OUTPATIENT)
Dept: FAMILY MEDICINE | Facility: CLINIC | Age: 53
End: 2023-11-07

## 2023-11-22 DIAGNOSIS — I10 ESSENTIAL HYPERTENSION: ICD-10-CM

## 2023-11-23 ENCOUNTER — PATIENT MESSAGE (OUTPATIENT)
Dept: ORTHOPEDICS | Facility: CLINIC | Age: 53
End: 2023-11-23
Payer: COMMERCIAL

## 2023-11-27 RX ORDER — NIFEDIPINE 30 MG/1
30 TABLET, EXTENDED RELEASE ORAL DAILY
Qty: 90 TABLET | Refills: 3 | Status: SHIPPED | OUTPATIENT
Start: 2023-11-27 | End: 2023-12-05

## 2023-11-27 RX ORDER — OLMESARTAN MEDOXOMIL 40 MG/1
40 TABLET ORAL DAILY
Qty: 90 TABLET | Refills: 3 | Status: SHIPPED | OUTPATIENT
Start: 2023-11-27 | End: 2024-11-26

## 2023-11-27 RX ORDER — NIFEDIPINE 30 MG/1
TABLET, EXTENDED RELEASE ORAL
Refills: 0 | OUTPATIENT
Start: 2023-11-27

## 2023-11-27 RX ORDER — OLMESARTAN MEDOXOMIL 40 MG/1
TABLET ORAL
Refills: 0 | OUTPATIENT
Start: 2023-11-27

## 2023-12-05 ENCOUNTER — PATIENT MESSAGE (OUTPATIENT)
Dept: FAMILY MEDICINE | Facility: CLINIC | Age: 53
End: 2023-12-05

## 2023-12-05 ENCOUNTER — OFFICE VISIT (OUTPATIENT)
Dept: ORTHOPEDICS | Facility: CLINIC | Age: 53
End: 2023-12-05
Payer: COMMERCIAL

## 2023-12-05 ENCOUNTER — PATIENT MESSAGE (OUTPATIENT)
Dept: ORTHOPEDICS | Facility: CLINIC | Age: 53
End: 2023-12-05

## 2023-12-05 VITALS — WEIGHT: 224 LBS | HEIGHT: 67 IN | BODY MASS INDEX: 35.16 KG/M2

## 2023-12-05 DIAGNOSIS — Z01.818 PREOP TESTING: ICD-10-CM

## 2023-12-05 DIAGNOSIS — M16.12 PRIMARY OSTEOARTHRITIS OF LEFT HIP: Primary | ICD-10-CM

## 2023-12-05 PROCEDURE — 4010F PR ACE/ARB THEARPY RXD/TAKEN: ICD-10-PCS | Mod: CPTII,S$GLB,, | Performed by: ORTHOPAEDIC SURGERY

## 2023-12-05 PROCEDURE — 3061F NEG MICROALBUMINURIA REV: CPT | Mod: CPTII,S$GLB,, | Performed by: ORTHOPAEDIC SURGERY

## 2023-12-05 PROCEDURE — 1159F MED LIST DOCD IN RCRD: CPT | Mod: CPTII,S$GLB,, | Performed by: ORTHOPAEDIC SURGERY

## 2023-12-05 PROCEDURE — 1160F RVW MEDS BY RX/DR IN RCRD: CPT | Mod: CPTII,S$GLB,, | Performed by: ORTHOPAEDIC SURGERY

## 2023-12-05 PROCEDURE — 99215 OFFICE O/P EST HI 40 MIN: CPT | Mod: S$GLB,,, | Performed by: ORTHOPAEDIC SURGERY

## 2023-12-05 PROCEDURE — 3008F PR BODY MASS INDEX (BMI) DOCUMENTED: ICD-10-PCS | Mod: CPTII,S$GLB,, | Performed by: ORTHOPAEDIC SURGERY

## 2023-12-05 PROCEDURE — 1160F PR REVIEW ALL MEDS BY PRESCRIBER/CLIN PHARMACIST DOCUMENTED: ICD-10-PCS | Mod: CPTII,S$GLB,, | Performed by: ORTHOPAEDIC SURGERY

## 2023-12-05 PROCEDURE — 4010F ACE/ARB THERAPY RXD/TAKEN: CPT | Mod: CPTII,S$GLB,, | Performed by: ORTHOPAEDIC SURGERY

## 2023-12-05 PROCEDURE — 99215 PR OFFICE/OUTPT VISIT, EST, LEVL V, 40-54 MIN: ICD-10-PCS | Mod: S$GLB,,, | Performed by: ORTHOPAEDIC SURGERY

## 2023-12-05 PROCEDURE — 3066F PR DOCUMENTATION OF TREATMENT FOR NEPHROPATHY: ICD-10-PCS | Mod: CPTII,S$GLB,, | Performed by: ORTHOPAEDIC SURGERY

## 2023-12-05 PROCEDURE — 99999 PR PBB SHADOW E&M-EST. PATIENT-LVL II: ICD-10-PCS | Mod: PBBFAC,,, | Performed by: ORTHOPAEDIC SURGERY

## 2023-12-05 PROCEDURE — 3066F NEPHROPATHY DOC TX: CPT | Mod: CPTII,S$GLB,, | Performed by: ORTHOPAEDIC SURGERY

## 2023-12-05 PROCEDURE — 99999 PR PBB SHADOW E&M-EST. PATIENT-LVL II: CPT | Mod: PBBFAC,,, | Performed by: ORTHOPAEDIC SURGERY

## 2023-12-05 PROCEDURE — 1159F PR MEDICATION LIST DOCUMENTED IN MEDICAL RECORD: ICD-10-PCS | Mod: CPTII,S$GLB,, | Performed by: ORTHOPAEDIC SURGERY

## 2023-12-05 PROCEDURE — 3008F BODY MASS INDEX DOCD: CPT | Mod: CPTII,S$GLB,, | Performed by: ORTHOPAEDIC SURGERY

## 2023-12-05 PROCEDURE — 3061F PR NEG MICROALBUMINURIA RESULT DOCUMENTED/REVIEW: ICD-10-PCS | Mod: CPTII,S$GLB,, | Performed by: ORTHOPAEDIC SURGERY

## 2023-12-05 RX ORDER — MUPIROCIN 20 MG/G
OINTMENT TOPICAL
Status: CANCELLED | OUTPATIENT
Start: 2023-12-05

## 2023-12-05 NOTE — H&P (VIEW-ONLY)
CC:  53-year-old female presents for evaluation of left hip pain.  The patient has osteoarthritis of her left hip.  We last saw her on 10/30/2023.  She received an ultrasound-guided injection in the left hip at that time.  Unfortunately she did not get sustained relief with the injection.  She continues to have a combination of a sharp throbbing aching pain in the left groin.  She currently rates the pain as a 3 to 4/10.  The patient states she is ready to schedule surgery.  She reports pain with basic daily activity such as shopping and housework and pain keeps her up at night.    Past Medical History:   Diagnosis Date    Essential hypertension 11/21/2022       No past surgical history on file.    Current Outpatient Medications on File Prior to Visit   Medication Sig Dispense Refill    albuterol (VENTOLIN HFA) 90 mcg/actuation inhaler Inhale 2 puffs into the lungs every 6 (six) hours as needed for Wheezing. Rescue 18 g 3    cloNIDine (CATAPRES) 0.1 MG tablet Take 1 tablet (0.1 mg total) by mouth every evening. 90 tablet 3    fluticasone propionate (FLONASE) 50 mcg/actuation nasal spray 1 spray (50 mcg total) by Each Nostril route once daily. 18.2 mL 3    hydroCHLOROthiazide (HYDRODIURIL) 25 MG tablet Take 1 tablet (25 mg total) by mouth once daily. 90 tablet 3    ibuprofen (ADVIL,MOTRIN) 600 MG tablet Take 1 tablet (600 mg total) by mouth 3 (three) times daily as needed for Pain. 90 tablet 3    NIFEdipine (PROCARDIA-XL) 30 MG (OSM) 24 hr tablet Take 1 tablet (30 mg total) by mouth once daily. 90 tablet 3    olmesartan (BENICAR) 40 MG tablet Take 1 tablet (40 mg total) by mouth once daily. 90 tablet 3    varenicline (CHANTIX) 1 mg Tab Take 1 mg by mouth 2 (two) times daily.      venlafaxine (EFFEXOR-XR) 75 MG 24 hr capsule Take 1 capsule (75 mg total) by mouth once daily. 90 capsule 3     No current facility-administered medications on file prior to visit.       ROS:    Constitution: Denies chills, fever, and  sweats.  HENT: Denies headaches or blurry vision.  Cardiovascular: Denies chest pain or irregular heart beat.  Respiratory: Denies cough or shortness of breath.  Gastrointestinal: Denies abdominal pain, nausea, or vomiting.  Genitourinary:  Denies urinary incontinence, bladder and kidney issues  Musculoskeletal:  Denies muscle cramps.  Neurological: Denies dizziness or focal weakness.  Psychiatric/Behavioral: Normal mental status.  Hematologic/Lymphatic: Denies bleeding problem or easy bruising/bleeding.  Skin: Denies rash or suspicious lesions.    Physical examination     Gen - No acute distress, well nourished, well groomed   Eyes - Extraoccular motions intact, pupils equally round and reactive to light and accommodation   ENT - normocephalic, atruamtic, oropharynx clear   Neck - Supple, no abnormal masses   Cardiovascular - regular rate and rhythm   Pulmonary - clear to auscultation bilaterally, no wheezes, ronchi, or rales   Abdomen - soft, non-tender, non-distended, positive bowel sounds   Psych - The patient is alert and oriented x3 with normal mood and affect    Examination of the Left Lower Extremity    Skin is intact throughout  Motor in intact EHL,FHL,TA,samson  +2 DP/PT  Sensation LT intact D/P/1st    Examination of the Left Hip    C-Sign positive  Logroll negative  Stenchfield positive    Pain with ROM negative    ROM:    Flexion   120  Extension   30  Abduction   45  Adduction   20  External Rotation 45  Internal Rotation 35    Flexion contracture negative    FADIR positive  FADER negative    Tenderness to palpation over lateral and posterolateral greater tochanter negative    X-ray images were examined and personally interpreted by me.  Three views left hip dated 08/07/2023 were once again reviewed.  There is osteoarthritis of the left hip with narrowing of the joint space and subchondral sclerosis noted.      Dx:  Osteoarthritis left hip that has failed p.o. medications and injections.    Plan:   Potential morbidity to the left lower extremity with both operative and non operative treatments were discussed.  Recommendation is for left total hip arthroplasty.  Risks and benefits of the surgery were explained to the patient.  She verbalized understanding and does wish to proceed.  We will schedule that for the next available date that is convenient for her.

## 2023-12-05 NOTE — LETTER
Surgical Clearance Form    PLEASE PROVIDE ALL INFORMATION      Date : 2023  Dr. Hills,     Please provide us with WRITTEN Surgical Clearance on Mr/ Mrs/ Ms.   Nohelia Turk.  : 1970.                                                Patient will be scheduled for a  Left Total Hip Replacement under CHOICE anesthesia scheduled for  2023  With Carlos Alberto Bejarano II, MD .        Is it in your medical opinion that patient is medically fit to undergo surgical procedure at this time?      ____ YES    ____ NO      PLEASE FAX THIS CLEARANCE WITH TEST RESULTS [phone].  Thank you for your help in this matter.    Sincerely,      MD Jaelyn Costello II  Phone  [phone]  Fax- [phone]

## 2023-12-06 ENCOUNTER — PATIENT MESSAGE (OUTPATIENT)
Dept: FAMILY MEDICINE | Facility: CLINIC | Age: 53
End: 2023-12-06

## 2023-12-06 ENCOUNTER — HOSPITAL ENCOUNTER (OUTPATIENT)
Dept: PREADMISSION TESTING | Facility: HOSPITAL | Age: 53
Discharge: HOME | End: 2023-12-06
Attending: ORTHOPAEDIC SURGERY
Payer: COMMERCIAL

## 2023-12-06 ENCOUNTER — HOSPITAL ENCOUNTER (OUTPATIENT)
Dept: RADIOLOGY | Facility: HOSPITAL | Age: 53
Discharge: HOME | End: 2023-12-06
Attending: ORTHOPAEDIC SURGERY
Payer: COMMERCIAL

## 2023-12-06 VITALS — WEIGHT: 215 LBS | BODY MASS INDEX: 33.67 KG/M2

## 2023-12-06 DIAGNOSIS — Z01.818 PREOP TESTING: Primary | ICD-10-CM

## 2023-12-06 DIAGNOSIS — M16.12 PRIMARY OSTEOARTHRITIS OF LEFT HIP: ICD-10-CM

## 2023-12-06 PROCEDURE — 71046 XR CHEST PA AND LATERAL: ICD-10-PCS | Mod: 26,,, | Performed by: RADIOLOGY

## 2023-12-06 PROCEDURE — 93005 ELECTROCARDIOGRAM TRACING: CPT

## 2023-12-06 PROCEDURE — 93010 ELECTROCARDIOGRAM REPORT: CPT | Mod: ,,, | Performed by: GENERAL PRACTICE

## 2023-12-06 PROCEDURE — 93010 EKG 12-LEAD: ICD-10-PCS | Mod: ,,, | Performed by: GENERAL PRACTICE

## 2023-12-06 PROCEDURE — 71046 X-RAY EXAM CHEST 2 VIEWS: CPT | Mod: TC

## 2023-12-06 PROCEDURE — 71046 X-RAY EXAM CHEST 2 VIEWS: CPT | Mod: 26,,, | Performed by: RADIOLOGY

## 2023-12-06 RX ORDER — MAGNESIUM 30 MG
TABLET ORAL NIGHTLY
COMMUNITY

## 2023-12-06 RX ORDER — FERROUS SULFATE, DRIED 160(50) MG
1 TABLET, EXTENDED RELEASE ORAL 2 TIMES DAILY WITH MEALS
COMMUNITY

## 2023-12-06 NOTE — TELEPHONE ENCOUNTER
"Per Hannah" I haven't seen her since 11/1. Will this be sufficient or does it have to be within a certain time? I see Dr Bejarano ordered an EKG, but she is a smoker and I'd add a chest xray, and she'll need labs, and at least a nurse visit for BP check. I am not signing a clearance with her last BP being charted with a DBP over 100. You can also ask Dr. Hills, however, her opinion.   Shadia "  "

## 2023-12-06 NOTE — PRE ADMISSION SCREENING
Patient Name: Nohelia Turk  YOB: 1970   MRN: [id number]     [facility]   Basic Mobility Inpatient Short Form 6 Clicks         How much difficulty does the patient currently have  Unable  A Lot  A Little  None      1. Turning over in bed (including adjusting bedclothes, sheets and blankets)?     1 []    2 []    3 []    4 [x]        2. Sitting down on and standing up from a chair with arms (e.g., wheelchair, bedside commode, etc.)     1 []  2 []  3 []     4 [x]      3. Moving from lying on back to sitting on the side of the bed?     1 []  2 []  3 []    4 [x]    How much help from another person does the patient currently need  Total  A Lot  A Little  None      4. Moving to and from a bed to a chair (including a wheelchair)?    1 []  2 []  3 []    4 [x]      5. Need to walk in hospital room?    1 []  2 []  3 []    4 [x]      6. Climbing 3-5 steps with a railing?    1 []  2 []  3 []    4 [x]       Raw Score:     24             CMS 0-100% Score:     0       %   Standardized Score:  61.14             CMS Modifier:        [facility] AM-PAC   Basic Mobility Inpatient Short Form 6 Clicks Score Conversion Table*         *Use this form to convert -PAC Basic Mobility Inpatient Raw Scores.   [facility] Inpatient Basic Mobility Short Form Scoring Example   1. Add the number values associated with the response to each item. For example, items totals yield a Raw Score of 21.   2. Match the raw score to the t-Scale scores (t-Scale score = 50.25, SE = 4.69).   3. Find the associated CMS % (CMS % = 28.97%).   4. Locate the correct CMS Functional Modifier Code, or G Code (G code = CJ)     NOTE: Each -PAC Short Form has a separate conversion table. Make sure that you use the correct conversion table.       Instruction Manual - page 45 contains conversion table

## 2023-12-06 NOTE — DISCHARGE INSTRUCTIONS
To confirm, Your doctor has instructed you that surgery is scheduled for: 12/20/23 WITH DR. IRWIN    Please report to [facility], Registration the morning of surgery. You must check-in and receive a wristband before going to your procedure.  [address] [place] [address] [place] [address]    Pre-Op will call the afternoon prior to surgery between 1:00 and 6:00 PM with the final arrival time.  Phone number: [phone]    PLEASE NOTE:  The surgery schedule has many variables which may affect the time of your surgery case.  Family members should be available if your surgery time changes.  Plan to be here the day of your procedure between 4-6 hours.    MEDICATIONS:  TAKE ONLY THESE MEDICATIONS WITH A SMALL SIP OF WATER THE MORNING OF YOUR PROCEDURE:  SEE LIST      DO NOT TAKE THESE MEDICATIONS 5-7 DAYS PRIOR to your procedure or per your surgeon's request:   ASPIRIN, ALEVE, ADVIL, IBUPROFEN, FISH OIL VITAMIN E, HERBALS  (May take Tylenol)    ONLY if you are prescribed any types of blood thinners such as:  Aspirin, Coumadin, Plavix, Pradaxa, Xarelto, Aggrenox, Effient, Eliquis, Savasya, Brilinta, or any other, ask your surgeon whether you should stop taking them and how long before surgery you should stop.  You may also need to verify with the prescribing physician if it is ok to stop your medication.      INSTRUCTIONS IMPORTANT!!  Do not eat or drink anything between midnight and the time of your procedure- this includes gum, mints, and candy.  Do not smoke or drink alcoholic beverages 24 hours prior to your procedure.  Shower the night before AND the morning of your procedure with a Chlorhexidine wash such as Hibiclens or Dial antibacterial soap from the neck down.  Do not get it on your face or in your eyes.  You may use your own shampoo and face wash. This helps your skin to be as bacteria free as possible.    If you wear contact lenses, dentures, hearing aids or glasses, bring a container to put them  in during surgery and give to a family member for safe keeping.  Please leave all jewelry, piercing's and valuables at home. You must remove your false eyelashes prior to surgery.    DO NOT remove hair from the surgery site.  Do not shave the incision site unless you are given specific instructions to do so.    ONLY if you have been diagnosed with sleep apnea please bring your C-PAP machine.  ONLY if you wear home oxygen please bring your portable oxygen tank the day of your procedure.  ONLY if you have a history of OPEN HEART SURGERY you will need a clearance from your Cardiologist per Anesthesia.      ONLY for patients requiring bowel prep, written instructions will be given by your doctor's office.  ONLY if you have a neuro stimulator, please bring the controller with you the morning of surgery  ONLY if a type and screen test is needed before surgery, please return:  If your doctor has scheduled you for an overnight stay, bring a small overnight bag with any personal items you need.  Make arrangements in advance for transportation home by a responsible adult.  It is not safe to drive a vehicle during the 24 hours after anesthesia.          All  facilities and properties are tobacco free.  Smoking is NOT allowed.   If you have any questions about these instructions, call Pre-Op Admit  Nursing at [phone] or the Pre-Op Day Surgery Unit at [phone].

## 2023-12-06 NOTE — PRE ADMISSION SCREENING
JOINT CAMP ASSESSMENT    Name Nohelia Turk   MRN [id number]    Age/Sex 53 y.o. female    Surgeon Dr. Carlos Alberto Bejarano II   Joint Camp Date 12/6/2023   Surgery Date 12/20/2023   Procedure Left Hip Arthroplasty   Insurance Payor: [employer] / Plan: [facility] CHOICE PLUS / Product Type: Commercial /    Care Team Patient Care Team:  Tu Hills MD as PCP - General (Family Medicine)  Peri Mitchell MD as Consulting Physician (Gastroenterology)  Jovanni Viera OD (Optometry)    Pharmacy   [facility] - [place], [place] - [address]  [address] [address]  Phone: [phone] Fax: [phone]    [id number] - [place], [place] - [address] AT [facility] [address]  [address]  [facility] [address]  Phone: [phone] Fax: [phone]    [id number] - [place], [place] - [address] AT [facility] [address]  [address]   [facility] [address]  Phone: [phone] Fax: [phone]    [id number] - [place], [place] - [address]  [address]  [place] [address]  Phone: [phone] Fax: [phone]     AM-PAC Score   24   Risk Assessment Score 24     Past Medical History:   Diagnosis Date    Essential hypertension 11/21/2022       No past surgical history on file.      Home Enviroment     Living Arrangement: Lives with spouse  Home Environment: 1-story house/ trailer, number of outside stairs: 3 without a railing, walk-in shower  Home Safety Concerns: Pets in the home: dogs (2).    DISCHARGE CAREGIVER/SUPPORT SYSTEM     Identified post-op caregiver: Patient has spouse / significant other.  Patient's caregiver(s) will be able to provide physical assistance. Patient will have someone to assist overnight.      Caregiver present at pre-op interview:  No      PRE-OPERATIVE FUNCTIONAL STATUS     Employment: Employed full time    Pre-op Functional Status: Patient is independent with  mobility/ambulation, transfers, ADL's, IADL's.    Use of assistive device for ambulation: none  ADL: self care  ADL Limitations: difficulty with walking  Medical Restrictions: Unstable ambulation and Decreased range of motions in extremities    POTENTIAL BARRIERS TO DISCHARGE/POTENTIAL POST-OP COMPLICATIONS     Patient is a current everyday smoker which could delay wound healing. Patient with hx of HTN. POSSIBLE SAME DAY DISCHARGE.    DISCHARGE PLAN     Expected LOS of 1 days or less for joint replacement discussed with patient. We also discussed a discharge path of HH for approximately two weeks with a transition to outpatient PT on the third week given no post-op complications.      Patient in agreement with discharge plan: Yes    Discharge to: Discharge home with home health (PT/OT) x2 weeks with transition to outpatient PT     HH:  [facility] ([place], [place]). Patient disclosure form completed and sent to case management for upload to the medical record.      OP PT: [facility] ([address]     Home DME: rolling walker, single point cane, quad cane, bedside commode, and assistive device kit    Needed DME at D/C: none     Rx: Per Dr. Carlos Alberto Bejarano at discharge     Meds to Beds: Yes  Patient expected to discharge on Aspirin 81mg by mouth twice daily for DVT prophylaxis.

## 2023-12-07 ENCOUNTER — PATIENT MESSAGE (OUTPATIENT)
Dept: ORTHOPEDICS | Facility: CLINIC | Age: 53
End: 2023-12-07
Payer: COMMERCIAL

## 2023-12-11 ENCOUNTER — CLINICAL SUPPORT (OUTPATIENT)
Dept: FAMILY MEDICINE | Facility: CLINIC | Age: 53
End: 2023-12-11
Payer: COMMERCIAL

## 2023-12-11 DIAGNOSIS — I10 ESSENTIAL HYPERTENSION: Primary | ICD-10-CM

## 2023-12-11 NOTE — PROGRESS NOTES
Patient here for BP check did not bring blood pressure log. No dizziness, no headache, some swelling of left foot started today. Uses wrist blood pressure cuff.   Taking:  Olmesartan 40 mg 1 daily  Procardia 30 mg 1 daily  Hydrochlorothiazide 25 mg every other day due to feeling of drying.     At home BP no higher than 127/82    XC=328/82    Per Shannon SHAH, medications patient is on do not cause swelling, patient to keep and eye on swelling since started today and elevate legs when gets home. Keep us updated if swelling persists. Patient states swelling started today and was busy getting household ready for upcoming left hip surgery.     
- - -

## 2023-12-13 DIAGNOSIS — M16.12 PRIMARY OSTEOARTHRITIS OF LEFT HIP: Primary | ICD-10-CM

## 2023-12-14 RX ORDER — ONDANSETRON 8 MG/1
8 TABLET, ORALLY DISINTEGRATING ORAL 2 TIMES DAILY PRN
Qty: 30 TABLET | Refills: 0 | Status: SHIPPED | OUTPATIENT
Start: 2023-12-20

## 2023-12-14 RX ORDER — HYDROCODONE BITARTRATE AND ACETAMINOPHEN 7.5; 325 MG/1; MG/1
1 TABLET ORAL EVERY 6 HOURS PRN
Qty: 28 TABLET | Refills: 0 | Status: SHIPPED | OUTPATIENT
Start: 2023-12-19 | End: 2024-01-04

## 2023-12-14 RX ORDER — ASPIRIN 81 MG/1
81 TABLET ORAL 2 TIMES DAILY
Qty: 28 TABLET | Refills: 0 | Status: SHIPPED | OUTPATIENT
Start: 2023-12-20 | End: 2024-03-14

## 2023-12-19 ENCOUNTER — ANESTHESIA EVENT (OUTPATIENT)
Dept: SURGERY | Facility: HOSPITAL | Age: 53
End: 2023-12-19
Payer: COMMERCIAL

## 2023-12-20 ENCOUNTER — HOSPITAL ENCOUNTER (OUTPATIENT)
Facility: HOSPITAL | Age: 53
Discharge: HOME | End: 2023-12-20
Attending: ORTHOPAEDIC SURGERY | Admitting: ORTHOPAEDIC SURGERY
Payer: COMMERCIAL

## 2023-12-20 ENCOUNTER — ANESTHESIA (OUTPATIENT)
Dept: SURGERY | Facility: HOSPITAL | Age: 53
End: 2023-12-20
Payer: COMMERCIAL

## 2023-12-20 DIAGNOSIS — M16.12 PRIMARY OSTEOARTHRITIS OF LEFT HIP: Primary | ICD-10-CM

## 2023-12-20 DIAGNOSIS — Z01.818 PREOP TESTING: ICD-10-CM

## 2023-12-20 LAB
B-HCG UR QL: NEGATIVE
CTP QC/QA: YES

## 2023-12-20 PROCEDURE — 36000710: Performed by: ORTHOPAEDIC SURGERY

## 2023-12-20 PROCEDURE — 36000711: Performed by: ORTHOPAEDIC SURGERY

## 2023-12-20 PROCEDURE — 81025 URINE PREGNANCY TEST: CPT | Performed by: ANESTHESIOLOGY

## 2023-12-20 PROCEDURE — 63600175 PHARM REV CODE 636 W HCPCS: Performed by: NURSE ANESTHETIST, CERTIFIED REGISTERED

## 2023-12-20 PROCEDURE — 97535 SELF CARE MNGMENT TRAINING: CPT

## 2023-12-20 PROCEDURE — 99900031 HC PATIENT EDUCATION (STAT)

## 2023-12-20 PROCEDURE — 71000016 HC POSTOP RECOV ADDL HR: Performed by: ORTHOPAEDIC SURGERY

## 2023-12-20 PROCEDURE — D9220A PRA ANESTHESIA: ICD-10-PCS | Mod: ANES,,, | Performed by: ANESTHESIOLOGY

## 2023-12-20 PROCEDURE — 71000033 HC RECOVERY, INTIAL HOUR: Performed by: ORTHOPAEDIC SURGERY

## 2023-12-20 PROCEDURE — 63600175 PHARM REV CODE 636 W HCPCS: Performed by: ANESTHESIOLOGY

## 2023-12-20 PROCEDURE — D9220A PRA ANESTHESIA: Mod: CRNA,,, | Performed by: NURSE ANESTHETIST, CERTIFIED REGISTERED

## 2023-12-20 PROCEDURE — 97165 OT EVAL LOW COMPLEX 30 MIN: CPT

## 2023-12-20 PROCEDURE — 37000008 HC ANESTHESIA 1ST 15 MINUTES: Performed by: ORTHOPAEDIC SURGERY

## 2023-12-20 PROCEDURE — 25000003 PHARM REV CODE 250: Performed by: ANESTHESIOLOGY

## 2023-12-20 PROCEDURE — D9220A PRA ANESTHESIA: Mod: ANES,,, | Performed by: ANESTHESIOLOGY

## 2023-12-20 PROCEDURE — 27130 PR TOTAL HIP ARTHROPLASTY: ICD-10-PCS | Mod: LT,,, | Performed by: ORTHOPAEDIC SURGERY

## 2023-12-20 PROCEDURE — 71000015 HC POSTOP RECOV 1ST HR: Performed by: ORTHOPAEDIC SURGERY

## 2023-12-20 PROCEDURE — C1776 JOINT DEVICE (IMPLANTABLE): HCPCS | Performed by: ORTHOPAEDIC SURGERY

## 2023-12-20 PROCEDURE — 27130 TOTAL HIP ARTHROPLASTY: CPT | Mod: LT,,, | Performed by: ORTHOPAEDIC SURGERY

## 2023-12-20 PROCEDURE — 94799 UNLISTED PULMONARY SVC/PX: CPT | Mod: XB

## 2023-12-20 PROCEDURE — D9220A PRA ANESTHESIA: ICD-10-PCS | Mod: CRNA,,, | Performed by: NURSE ANESTHETIST, CERTIFIED REGISTERED

## 2023-12-20 PROCEDURE — 63600175 PHARM REV CODE 636 W HCPCS: Performed by: ORTHOPAEDIC SURGERY

## 2023-12-20 PROCEDURE — 97161 PT EVAL LOW COMPLEX 20 MIN: CPT

## 2023-12-20 PROCEDURE — 37000009 HC ANESTHESIA EA ADD 15 MINS: Performed by: ORTHOPAEDIC SURGERY

## 2023-12-20 PROCEDURE — 27201423 OPTIME MED/SURG SUP & DEVICES STERILE SUPPLY: Performed by: ORTHOPAEDIC SURGERY

## 2023-12-20 PROCEDURE — 71000039 HC RECOVERY, EACH ADD'L HOUR: Performed by: ORTHOPAEDIC SURGERY

## 2023-12-20 PROCEDURE — 25000003 PHARM REV CODE 250: Performed by: NURSE ANESTHETIST, CERTIFIED REGISTERED

## 2023-12-20 PROCEDURE — 97116 GAIT TRAINING THERAPY: CPT

## 2023-12-20 DEVICE — HEAD FEMORAL COCR 36MM M2A: Type: IMPLANTABLE DEVICE | Site: HIP | Status: FUNCTIONAL

## 2023-12-20 DEVICE — IMPLANTABLE DEVICE: Type: IMPLANTABLE DEVICE | Site: HIP | Status: FUNCTIONAL

## 2023-12-20 RX ORDER — ONDANSETRON 2 MG/ML
4 INJECTION INTRAMUSCULAR; INTRAVENOUS ONCE AS NEEDED
Status: COMPLETED | OUTPATIENT
Start: 2023-12-20 | End: 2023-12-20

## 2023-12-20 RX ORDER — BUPIVACAINE HYDROCHLORIDE 5 MG/ML
INJECTION, SOLUTION EPIDURAL; INTRACAUDAL
Status: COMPLETED | OUTPATIENT
Start: 2023-12-20 | End: 2023-12-20

## 2023-12-20 RX ORDER — PROPOFOL 10 MG/ML
VIAL (ML) INTRAVENOUS
Status: DISCONTINUED | OUTPATIENT
Start: 2023-12-20 | End: 2023-12-20

## 2023-12-20 RX ORDER — LIDOCAINE HYDROCHLORIDE 10 MG/ML
1 INJECTION, SOLUTION EPIDURAL; INFILTRATION; INTRACAUDAL; PERINEURAL ONCE
Status: DISCONTINUED | OUTPATIENT
Start: 2023-12-20 | End: 2024-03-14

## 2023-12-20 RX ORDER — CEFAZOLIN SODIUM 2 G/50ML
2 SOLUTION INTRAVENOUS
Status: COMPLETED | OUTPATIENT
Start: 2023-12-20 | End: 2023-12-20

## 2023-12-20 RX ORDER — MIDAZOLAM HYDROCHLORIDE 1 MG/ML
2 INJECTION INTRAMUSCULAR; INTRAVENOUS
Status: DISCONTINUED | OUTPATIENT
Start: 2023-12-20 | End: 2024-03-14

## 2023-12-20 RX ORDER — MUPIROCIN 20 MG/G
OINTMENT TOPICAL
Status: DISCONTINUED | OUTPATIENT
Start: 2023-12-20 | End: 2023-12-20 | Stop reason: HOSPADM

## 2023-12-20 RX ORDER — MIDAZOLAM HYDROCHLORIDE 1 MG/ML
INJECTION INTRAMUSCULAR; INTRAVENOUS
Status: COMPLETED
Start: 2023-12-20 | End: 2023-12-20

## 2023-12-20 RX ORDER — FENTANYL CITRATE 50 UG/ML
25 INJECTION, SOLUTION INTRAMUSCULAR; INTRAVENOUS EVERY 5 MIN PRN
Status: COMPLETED | OUTPATIENT
Start: 2023-12-20 | End: 2023-12-20

## 2023-12-20 RX ORDER — ONDANSETRON HYDROCHLORIDE 2 MG/ML
INJECTION, SOLUTION INTRAMUSCULAR; INTRAVENOUS
Status: DISCONTINUED | OUTPATIENT
Start: 2023-12-20 | End: 2023-12-20

## 2023-12-20 RX ORDER — TRANEXAMIC ACID 100 MG/ML
INJECTION, SOLUTION INTRAVENOUS
Status: DISCONTINUED | OUTPATIENT
Start: 2023-12-20 | End: 2023-12-20

## 2023-12-20 RX ORDER — FENTANYL CITRATE 50 UG/ML
25-200 INJECTION, SOLUTION INTRAMUSCULAR; INTRAVENOUS
Status: DISCONTINUED | OUTPATIENT
Start: 2023-12-20 | End: 2024-03-14

## 2023-12-20 RX ORDER — OXYCODONE HYDROCHLORIDE 5 MG/1
5 TABLET ORAL ONCE AS NEEDED
Status: COMPLETED | OUTPATIENT
Start: 2023-12-20 | End: 2023-12-20

## 2023-12-20 RX ORDER — FENTANYL CITRATE 50 UG/ML
INJECTION, SOLUTION INTRAMUSCULAR; INTRAVENOUS
Status: DISCONTINUED | OUTPATIENT
Start: 2023-12-20 | End: 2023-12-20

## 2023-12-20 RX ORDER — METOCLOPRAMIDE HYDROCHLORIDE 5 MG/ML
10 INJECTION INTRAMUSCULAR; INTRAVENOUS ONCE AS NEEDED
Status: COMPLETED | OUTPATIENT
Start: 2023-12-20 | End: 2023-12-20

## 2023-12-20 RX ORDER — OXYCODONE HYDROCHLORIDE 5 MG/1
5 TABLET ORAL ONCE
Status: COMPLETED | OUTPATIENT
Start: 2023-12-20 | End: 2023-12-20

## 2023-12-20 RX ORDER — MIDAZOLAM HYDROCHLORIDE 1 MG/ML
INJECTION INTRAMUSCULAR; INTRAVENOUS
Status: DISCONTINUED | OUTPATIENT
Start: 2023-12-20 | End: 2023-12-20

## 2023-12-20 RX ORDER — LIDOCAINE HYDROCHLORIDE 20 MG/ML
INJECTION INTRAVENOUS
Status: DISCONTINUED | OUTPATIENT
Start: 2023-12-20 | End: 2023-12-20

## 2023-12-20 RX ORDER — PROPOFOL 10 MG/ML
VIAL (ML) INTRAVENOUS CONTINUOUS PRN
Status: DISCONTINUED | OUTPATIENT
Start: 2023-12-20 | End: 2023-12-20

## 2023-12-20 RX ADMIN — FENTANYL CITRATE 25 MCG: 50 INJECTION INTRAMUSCULAR; INTRAVENOUS at 01:12

## 2023-12-20 RX ADMIN — PROPOFOL 50 MCG/KG/MIN: 10 INJECTION, EMULSION INTRAVENOUS at 10:12

## 2023-12-20 RX ADMIN — PROPOFOL 20 MG: 10 INJECTION, EMULSION INTRAVENOUS at 10:12

## 2023-12-20 RX ADMIN — BUPIVACAINE HYDROCHLORIDE 2 ML: 5 INJECTION, SOLUTION EPIDURAL; INTRACAUDAL; PERINEURAL at 10:12

## 2023-12-20 RX ADMIN — LIDOCAINE HYDROCHLORIDE 20 MG: 20 INJECTION, SOLUTION INTRAVENOUS at 10:12

## 2023-12-20 RX ADMIN — FENTANYL CITRATE 25 MCG: 0.05 INJECTION, SOLUTION INTRAMUSCULAR; INTRAVENOUS at 12:12

## 2023-12-20 RX ADMIN — SODIUM CHLORIDE, SODIUM GLUCONATE, SODIUM ACETATE, POTASSIUM CHLORIDE AND MAGNESIUM CHLORIDE: 526; 502; 368; 37; 30 INJECTION, SOLUTION INTRAVENOUS at 09:12

## 2023-12-20 RX ADMIN — MIDAZOLAM HYDROCHLORIDE 2 MG: 1 INJECTION, SOLUTION INTRAMUSCULAR; INTRAVENOUS at 10:12

## 2023-12-20 RX ADMIN — ONDANSETRON 4 MG: 2 INJECTION INTRAMUSCULAR; INTRAVENOUS at 10:12

## 2023-12-20 RX ADMIN — METOCLOPRAMIDE 10 MG: 5 INJECTION, SOLUTION INTRAMUSCULAR; INTRAVENOUS at 03:12

## 2023-12-20 RX ADMIN — ONDANSETRON 4 MG: 2 INJECTION INTRAMUSCULAR; INTRAVENOUS at 12:12

## 2023-12-20 RX ADMIN — OXYCODONE 5 MG: 5 TABLET ORAL at 12:12

## 2023-12-20 RX ADMIN — CEFAZOLIN SODIUM 2 G: 2 SOLUTION INTRAVENOUS at 10:12

## 2023-12-20 RX ADMIN — TRANEXAMIC ACID 1000 MG: 100 INJECTION, SOLUTION INTRAVENOUS at 10:12

## 2023-12-20 RX ADMIN — OXYCODONE 5 MG: 5 TABLET ORAL at 01:12

## 2023-12-20 RX ADMIN — FENTANYL CITRATE 50 MCG: 0.05 INJECTION, SOLUTION INTRAMUSCULAR; INTRAVENOUS at 10:12

## 2023-12-20 RX ADMIN — FENTANYL CITRATE 50 MCG: 0.05 INJECTION, SOLUTION INTRAMUSCULAR; INTRAVENOUS at 12:12

## 2023-12-20 RX ADMIN — SODIUM CHLORIDE, SODIUM GLUCONATE, SODIUM ACETATE, POTASSIUM CHLORIDE AND MAGNESIUM CHLORIDE 500 ML: 526; 502; 368; 37; 30 INJECTION, SOLUTION INTRAVENOUS at 03:12

## 2023-12-20 RX ADMIN — FENTANYL CITRATE 25 MCG: 0.05 INJECTION, SOLUTION INTRAMUSCULAR; INTRAVENOUS at 11:12

## 2023-12-20 NOTE — TRANSFER OF CARE
Anesthesia Transfer of Care Note    Patient: Nohelia Turk    Procedure(s) Performed: Procedure(s) (LRB):  ARTHROPLASTY, HIP (Left)    Patient location: PACU    Anesthesia Type: MAC    Transport from OR: Transported from OR on 6-10 L/min O2 by face mask with adequate spontaneous ventilation    Post pain: adequate analgesia    Post assessment: no apparent anesthetic complications and tolerated procedure well    Post vital signs: stable    Level of consciousness: awake    Nausea/Vomiting: no nausea/vomiting    Transfer of care protocol was followed      Last vitals: Visit Vitals  /83 (BP Location: Right arm, Patient Position: Lying)   Pulse 91   Temp 36.7 °C (98 °F)   Resp 16   Wt 97.5 kg (215 lb)   LMP  (LMP Unknown)   SpO2 96%   Breastfeeding No   BMI 33.67 kg/m²

## 2023-12-20 NOTE — PT/OT/SLP EVAL
Physical Therapy Evaluation and Discharge Note    Patient Name:  Nohelia Turk   MRN:  [id number]    Recommendations:     Discharge Recommendations: Low Intensity Therapy  Discharge Equipment Recommendations: none   Barriers to discharge: None    Assessment:     Nohelia Turk is a 53 y.o. female admitted with a medical diagnosis of left MAGDALENE .  At this time, patient is scheduled for discharge home and appears will be safe with mobility in the home.  Patient would benefit though from continued PT with HHPT to work on strengthening and further increase safety with mobility.       Recent Surgery: Procedure(s) (LRB):  ARTHROPLASTY, HIP (Left) Day of Surgery    Plan:     During this hospitalization, patient does not require further acute PT services.  Please re-consult if situation changes.      Subjective     Chief Complaint: feeling nauseated and tired  Patient/Family Comments/goals: go home and sleep  Pain/Comfort:  Pain Rating 1: 5/10  Location - Side 1: Left  Location - Orientation 1: lower  Location 1: hip  Pain Addressed 1: Reposition, Cessation of Activity  Pain Rating Post-Intervention 1: 5/10    Patients cultural, spiritual, Hinduism conflicts given the current situation:      Living Environment:  Currently lives with spouse in 1 story home with 3 step entry but wide step that could fit a RW.  Prior to admission, patients level of function was independent.  Equipment used at home: cane, straight, walker, rolling, bedside commode.  DME owned (not currently used): none.  Upon discharge, patient will have assistance from family.    Objective:     Communicated with nurse prior to session.  Patient found supine with blood pressure cuff, peripheral IV upon PT entry to room.    General Precautions: Standard,      Orthopedic Precautions:LLE weight bearing as tolerated, LLE posterior precautions   Braces:    Respiratory Status: Room air    Exams:  RLE ROM: WFL  RLE Strength: WNL  LLE ROM: not tested  LLE  Strength: Deficits: 3-/5 overall    Functional Mobility:  Bed Mobility:     Supine to Sit: stand by assistance  Transfers:     Sit to Stand:  minimum assistance with rolling walker  Bed to Chair: minimum assistance with  rolling walker  using  Step Transfer  Gait: x 25 feet rw CGA    AM-PAC 6 CLICK MOBILITY  Total Score:20       Treatment and Education:  Transfer training sit to stand RW min, sit to stand RW CGA  Gait training x 25 feet, x 40 feet, x 100 feet rW CGA, x 40 feet RW SBA, up/down 2 4 inch steps RW CGA  Patient reported being ready to go home and felt like she would have no problems    AM-PAC 6 CLICK MOBILITY  Total Score:20     Patient left up in chair with call button in reach, nurse notified, and spouse present.    GOALS:   Multidisciplinary Problems       Physical Therapy Goals       Not on file                    History:     Past Medical History:   Diagnosis Date    Essential hypertension 11/21/2022       History reviewed. No pertinent surgical history.    Time Tracking:     PT Received On: 12/20/23  PT Start Time: 1653     PT Stop Time: 1727  PT Total Time (min): 34 min     Billable Minutes: Evaluation 20 and Gait Training 14      12/20/2023

## 2023-12-20 NOTE — ANESTHESIA PREPROCEDURE EVALUATION
12/20/2023  Nohelia Turk is a 53 y.o., female.      Pre-op Assessment    I have reviewed the Patient Summary Reports.     I have reviewed the Nursing Notes. I have reviewed the NPO Status.   I have reviewed the Medications.     Review of Systems  Anesthesia Hx:             Denies Family Hx of Anesthesia complications.    Denies Personal Hx of Anesthesia complications.                    Social:  Smoker       Cardiovascular:     Hypertension                                        Pulmonary:   COPD, moderate      Chronic bronchitis               Endocrine:        Obesity / BMI > 30  Psych:    depression                Physical Exam  General: Cooperative, Alert and Oriented    Airway:  Mallampati: III   Mouth Opening: Normal  TM Distance: Normal  Tongue: Normal  Neck ROM: Normal ROM  Neck: Girth Increased    Chest/Lungs:  Normal Respiratory Rate    Heart:  Rate: Normal  Rhythm: Regular Rhythm        Anesthesia Plan  Type of Anesthesia, risks & benefits discussed:    Anesthesia Type: Spinal  Intra-op Monitoring Plan: Standard ASA Monitors  Post Op Pain Control Plan: multimodal analgesia  Informed Consent: Informed consent signed with the Patient and all parties understand the risks and agree with anesthesia plan.  All questions answered.   ASA Score: 3    Ready For Surgery From Anesthesia Perspective.     .

## 2023-12-20 NOTE — DISCHARGE SUMMARY
[facility]  Department of Orthopedic Surgery  Discharge Note  Short Stay    Admit Date: 12/20/2023    Discharge Date and Time: No discharge date for patient encounter.     Attending Physician: Carlos Alberto Bejarano II, MD     Discharge Provider: Carlos Alberto Bejarano II    Diagnoses:  Active Hospital Problems    Diagnosis  POA    Primary osteoarthritis of left hip [M16.12]  Unknown      Resolved Hospital Problems   No resolved problems to display.       Discharged Condition: good    Hospital Course: Patient was admitted for an outpatient procedure and tolerated the procedure well with no complications.    Final Diagnoses: Same as principal problem.    Disposition: Home or Self Care    Follow up/Patient Instructions:    Medications:  Reconciled Home Medications:      Medication List        CHANGE how you take these medications      hydroCHLOROthiazide 25 MG tablet  Commonly known as: HYDRODIURIL  Take 1 tablet (25 mg total) by mouth once daily.  What changed: additional instructions            CONTINUE taking these medications      albuterol 90 mcg/actuation inhaler  Commonly known as: VENTOLIN HFA  Inhale 2 puffs into the lungs every 6 (six) hours as needed for Wheezing. Rescue     aspirin 81 MG EC tablet  Commonly known as: ECOTRIN  Take 1 tablet (81 mg total) by mouth 2 (two) times a day. for 14 days     calcium-vitamin D3 500 mg-5 mcg (200 unit) per tablet  Commonly known as: OS-ALDO 500 + D3  Take 1 tablet by mouth 2 (two) times daily with meals.     cloNIDine 0.1 MG tablet  Commonly known as: CATAPRES  Take 1 tablet (0.1 mg total) by mouth every evening.     DHEA ORAL  Take by mouth.     fluticasone propionate 50 mcg/actuation nasal spray  Commonly known as: FLONASE  1 spray (50 mcg total) by Each Nostril route once daily.     HYDROcodone-acetaminophen 7.5-325 mg per tablet  Commonly known as: NORCO  Take 1 tablet by mouth every 6 (six) hours as needed for Pain.     ibuprofen 600 MG tablet  Commonly known as:  ADVIL,MOTRIN  Take 1 tablet (600 mg total) by mouth 3 (three) times daily as needed for Pain.     magnesium 30 mg Tab  Take by mouth every evening.     olmesartan 40 MG tablet  Commonly known as: BENICAR  Take 1 tablet (40 mg total) by mouth once daily.     ondansetron 8 MG Tbdl  Commonly known as: ZOFRAN-ODT  Take 1 tablet (8 mg total) by mouth 2 (two) times daily as needed.     varenicline 1 mg Tab  Commonly known as: CHANTIX  Take 1 mg by mouth 2 (two) times daily.     venlafaxine 75 MG 24 hr capsule  Commonly known as: EFFEXOR-XR  Take 1 capsule (75 mg total) by mouth once daily.            Discharge Procedure Orders   Diet Adult Regular     Ice to affected area     Notify your health care provider if you experience any of the following:  increased confusion or weakness     Notify your health care provider if you experience any of the following:  persistent dizziness, light-headedness, or visual disturbances     Notify your health care provider if you experience any of the following:  worsening rash     Notify your health care provider if you experience any of the following:  severe persistent headache     Notify your health care provider if you experience any of the following:  difficulty breathing or increased cough     Notify your health care provider if you experience any of the following:  redness, tenderness, or signs of infection (pain, swelling, redness, odor or green/yellow discharge around incision site)     Notify your health care provider if you experience any of the following:  severe uncontrolled pain     Notify your health care provider if you experience any of the following:  persistent nausea and vomiting or diarrhea     Notify your health care provider if you experience any of the following:  temperature >100.4     Leave dressing on - Keep it clean, dry, and intact until clinic visit     Weight bearing restrictions (specify):   Order Comments: WBAT LLE      Follow-up Information        [facility]. Call in 1 day(s).    Specialties: Home Health Services, Home Therapy Services, Home Living Aide Services  Contact information:  [address] [place] [address]  [place] [address]  [phone]             Carlos Alberto Bejarano II, MD Follow up in 2 week(s).    Specialty: Orthopedic Surgery  Contact information:  [address] [place] [address] [address] [place] [address]  [phone]                             Discharge Procedure Orders (must include Diet, Follow-up, Activity):   Discharge Procedure Orders (must include Diet, Follow-up, Activity)   Diet Adult Regular     Ice to affected area     Notify your health care provider if you experience any of the following:  increased confusion or weakness     Notify your health care provider if you experience any of the following:  persistent dizziness, light-headedness, or visual disturbances     Notify your health care provider if you experience any of the following:  worsening rash     Notify your health care provider if you experience any of the following:  severe persistent headache     Notify your health care provider if you experience any of the following:  difficulty breathing or increased cough     Notify your health care provider if you experience any of the following:  redness, tenderness, or signs of infection (pain, swelling, redness, odor or green/yellow discharge around incision site)     Notify your health care provider if you experience any of the following:  severe uncontrolled pain     Notify your health care provider if you experience any of the following:  persistent nausea and vomiting or diarrhea     Notify your health care provider if you experience any of the following:  temperature >100.4     Leave dressing on - Keep it clean, dry, and intact until clinic visit     Weight bearing restrictions (specify):   Order Comments: WBAT LLE

## 2023-12-20 NOTE — PT/OT/SLP EVAL
Occupational Therapy   Evaluation and Discharge Note    Name: Nohelia Turk  MRN: [id number]  Admitting Diagnosis: <principal problem not specified>  Recent Surgery: Procedure(s) (LRB):  ARTHROPLASTY, HIP (Left) Day of Surgery    Recommendations:     Discharge Recommendations: Low Intensity Therapy  Discharge Equipment Recommendations: none; Patient owns a raised toilet seat and a shower chair.   Barriers to discharge:      Assessment:     Nohelia Turk is a 53 y.o. female with a medical diagnosis of left MAGDALENE. Pt was agreeable to participate in OT. Pt was given education on hip precautions including no hip flexion greater than 90 degrees, no IR of hip and no adduction of hip. Pt required mod A and min A with supine <> sit. Pt required min A and verbal cues for hand placement with sit <> stand transfer, bed > chair transfer and toilet transfer using a RW. She required min A with toileting for clothing management. Pt was given instruction and demonstration of assistive device including a reacher to increase her independence with LBD. Pt verbalized understanding and required max A to don her pants using a reacher. Pt declined instruction on use of a sock aid. Pt donned a pullover shirt with set up A/SBA seated in chair. Pt was given education on use of DME including a raised toilet seat and shower chair to maintain precautions during functional transfers. Pt is scheduled for discharge today and would benefit from skilled home health OT services to increase her independence with ADLs and functional transfer skills to return to her PLOF. At this time, patient does not require further acute OT services.     Plan:     During this hospitalization, patient does not require further acute OT services.  Please re-consult if situation changes.      Subjective     Chief Complaint: Left hip pain and nausea  Patient/Family Comments/goals: Pain and nausea relief    Occupational Profile:  Living Environment: Pt lives with her  .  Previous level of function: Independent   Equipment Used at home: none  Assistance upon Discharge: Pt will have assistance from her family.    Pain/Comfort:  Pain Rating 1: 7/10  Location - Side 1: Left  Location 1: hip    Patients cultural, spiritual, Jainism conflicts given the current situation: yes    Objective:     Communicated with: nurse prior to session.  Patient found supine with blood pressure cuff, peripheral IV, pulse ox (continuous) upon OT entry to room.    General Precautions: Standard, fall  Orthopedic Precautions: LLE weight bearing as tolerated, LLE posterior precautions  Braces: N/A  Respiratory Status: Room air     Occupational Performance:    Bed Mobility:    Patient completed Scooting/Bridging with moderate assistance and min assistance  Patient completed Supine to Sit with moderate assistance and min assistance  Patient completed Sit to Supine with moderate assistance and min assistance    Functional Mobility/Transfers:  Patient completed Sit <> Stand Transfer with minimum assistance with rolling walker   Patient completed Bed <> Chair Transfer using Step Transfer technique with minimum assistance with rolling walker  Patient completed Toilet Transfer Step Transfer technique with minimum assistance with  rolling walker    Activities of Daily Living:  Upper Body Dressing: Pt donned a pull over shirt with set up A/stand by assistance seated in chair.  Lower Body Dressing: maximal assistance using a reacher  Toileting: min assistance for clothing management    Cognitive/Visual Perceptual:  Cognitive/Psychosocial Skills:  -       Oriented to: Person, Place, Time, and Situation   -       Follows Commands/attention: Follows multistep commands  -       Communication: clear/fluent  -       Safety awareness/insight to disability: intact   -       Mood/Affect/Coping skills/emotional control: Cooperative and Pleasant    Physical Exam:  Upper Extremity Range of Motion:  -       Right Upper  Extremity: WFL  -       Left Upper Extremity: WFL  Upper Extremity Strength: -       Right Upper Extremity: WFL  -       Left Upper Extremity: WFL      Treatment & Education:  Educated on Posterior hip precautions - patient recalled hip precautions  Educated on use of hip kit during LB dressing  Educated on safety with functional mobility; hand placement to ensure safe transfers to various surfaces in prep for ADLs  Educated on performing functional mobility and ADL's in adherence to orthopedic precautions.  Educated on weight bearing status    Patient left up in chair with all lines intact, nurse notified and patient's father in law present.    GOALS:   Multidisciplinary Problems       Occupational Therapy Goals       Not on file                    History:     Past Medical History:   Diagnosis Date    Essential hypertension 11/21/2022       History reviewed. No pertinent surgical history.    Time Tracking:     OT Date of Treatment: 12/20/23  OT Start Time: 1447  OT Stop Time: 1510  OT Start Time: 1622  OT Stop TIme: 1652  OT Total Time (min): 53 min    Billable Minutes:Evaluation 15  Self Care/Home Management 38    12/20/2023

## 2023-12-20 NOTE — ANESTHESIA POSTPROCEDURE EVALUATION
Anesthesia Post Evaluation    Patient: Nohelia Turk    Procedure(s) Performed: Procedure(s) (LRB):  ARTHROPLASTY, HIP (Left)    Final Anesthesia Type: spinal      Patient location during evaluation: PACU  Patient participation: Yes- Able to Participate  Level of consciousness: awake and alert  Post-procedure vital signs: reviewed and stable  Pain management: adequate  Airway patency: patent    PONV status at discharge: No PONV  Anesthetic complications: no      Cardiovascular status: blood pressure returned to baseline  Respiratory status: unassisted  Hydration status: euvolemic  Follow-up not needed.              Vitals Value Taken Time   /72 12/20/23 1620   Temp 36.5 °C (97.7 °F) 12/20/23 1340   Pulse 103 12/20/23 1620   Resp 16 12/20/23 1620   SpO2 97 % 12/20/23 1620         Event Time   Out of Recovery 13:50:00         Pain/Liseth Score: Pain Rating Prior to Med Admin: 6 (12/20/2023  1:40 PM)  Pain Rating Post Med Admin: 3 (12/20/2023  1:45 PM)  Liseth Score: 10 (12/20/2023  1:45 PM)  Modified Liseth Score: 18 (12/20/2023  1:50 PM)

## 2023-12-20 NOTE — PLAN OF CARE
Patient ready for surgery. Surgery and anesthesia consents signed. Educated on incentive spirometer use. Belongings with father-in-law. Spouse set up with text message notifications.

## 2023-12-20 NOTE — OP NOTE
[facility]  Orthopedic Surgery Department  Operative Note    SUMMARY     Date of Procedure: 12/20/2023     Procedure: Procedure(s) (LRB):  ARTHROPLASTY, HIP (Left)     Surgeon(s) and Role:     * Carlos Alberto Bejarano II, MD - Primary    Assisting Surgeon: None    First Assist:  CHINO Zacarias    Pre-Operative Diagnosis: Primary osteoarthritis of left hip [M16.12]  Preop testing [Z01.818]    Post-Operative Diagnosis: Post-Op Diagnosis Codes:     * Primary osteoarthritis of left hip [M16.12]     * Preop testing [Z01.818]    Anesthesia: Spinal    Technical Procedures Used:  Left total hip arthroplasty    Description of the Findings of the Procedure:  Dictated    Significant Surgical Tasks Conducted by the Assistant(s), if Applicable:  Positioning and prepping the patient, retraction during exposure and implant insertion, wound closure and bandage application.    Complications: No    Estimated Blood Loss (EBL): * No values recorded between 12/20/2023 11:13 AM and 12/20/2023 12:35 PM *           Implants:   Implant Name Type Inv. Item Serial No.  Lot No. LRB No. Used Action   BIOMET 50MM SHELL LINER D 3 HOLE ACETABULAR SHELL REF [id number]     [id number] Left 1 Implanted   HEAD FEMORAL COCR 36MM M2A - [id number]  HEAD FEMORAL COCR 36MM M2A  [employer] [id number] Left 1 Implanted   STEM ECHO HIP [id number] - [id number]  STEM ECHO HIP [id number]  [employer] [id number] Left 1 Implanted   EVETTE 36MM SZ D POLY REF [id number]     [id number] Left 1 Implanted       Specimens:   Specimen (24h ago, onward)      None                    Condition: Good    Disposition: PACU - hemodynamically stable.    Attestation: I was present for the entire procedure.    Procedure In Detail:      Procedure in Detail:  The patient was brought to the operating room and placed on the table in the supine position.  They underwent anesthesia with the anesthesia service.  They were then rolled into the Right lateral decubitus  position with the Left hip up and secured with the peg board.  The Left hip was then prepped and draped in the normal sterile fashion.  A posterior approach to the hip was created.  Dissection was taken down through skin and subcutaneous tissue to the tensor fascia lolis and fascia over the gluteus sim.  The fascia was sharply incised and then blunt finger dissection was taken through the sim musculature.  A Charnley retractor was placed.  The bursa was swept posteriorly.  The sciatic nerve was palpated and protected through out the remainder of the case.      The interval between the piriformis and the gluteus minimus was developed with a egan elevator.  The piriformis and other short external rotators were then taken as one cuff of tissue along with the hip capsule and tagged with a #5 Ethibond for later repair.  This lead to excellent exposure of the hip joint which was noted to be severely arthritic.  The hip was dislocated and the femoral neck was exposed.  A zunilda was made on the femoral neck with the bovie at 20mm proximal to the top of the lessor troachanter, based on pre-operative measurements.  The femoral neck cut was made and the head removed.  Hohmann retractors were placed anterior and posterior to the acetabulum and the femur was retracted anteriorly.  A capsulotomy was performed.  This lead to excellent exposure of the acetabulum.  The remnant of the labrum was resected and the cotyloid notch was cleared of soft tissue.  The transverse acetabular ligament was identified.  Sequential reaming was then undertaken to size 49mm.  A 49mm trial was noted to fit and fill the acetabulum appropriately.  A 50 mm Leia Biomet G7 cup was then impacted in the acetabulum.  When fully seated the entire pelvis with rock when attempting to move the cup.  A trial liner was place and then attention was turned to the femur.    A box osteotome was used open the proximal femur.  A canal finding Reamer followed by  lateralizing Reamer was then used.  Sequential reaming was then undertaken to size 7.  Sequential broaching was then undertaken to size 7.  With the broach in place we then trialed different head neck combinations.  With a standard neck and a +0 head leg lengths clinically appeared equal.  There was no excessive Shuck.  The hip could be flexed to 90° and internally rotated to 60 degrees before subluxation.  We felt this to be a good stable construct.  The trials were then removed.  A neutral liner was impacted into the acetabular shell.  A size 7 Leia Biomet Echo stem was then impacted into the femur.  A +0 head was then impacted onto the neck of the stem.  The hip was then reduced.  With the final components in place the hip had the same stability and range of motion as it did with the trials.  The hip was then placed in neutral position irrigated with normal saline and then closure was begun.    The short external rotators and hip capsule repaired to the medius tendon with Ethibond suture.  We then closed the fascia with a 0 PDS strata fix.  We closed deep to the subdermal layer with a 2 0 PDS strata fix and the subcuticular layer was closed with 3-0 Monocryl.  A Dermabond device was applied to the skin.  Once that had dried, a sterile Bioclusive intraoperative dressing was placed.  The patient was then rolled supine on the transport gurney with a hip abduction pillow in place.  A polar Care device was applied for postoperative pain control.  X-rays obtained in the operating room showed a well-fixed implant in good alignment with equal leg lengths.  The patient was then taken recovery where they were noted to be stable postoperatively.  Needle and lap counts were correct at the end of the case.

## 2023-12-20 NOTE — ANESTHESIA PROCEDURE NOTES
Spinal    Diagnosis: DJD hip left  Patient location during procedure: OR  Start time: 12/20/2023 10:37 AM  Timeout: 12/20/2023 10:37 AM  End time: 12/20/2023 10:42 AM    Staffing  Authorizing Provider: Harlan Ambrocio MD  Performing Provider: Harlan Ambrocio MD    Staffing  Performed by: Harlan Ambrocio MD  Authorized by: Harlan Ambrocio MD    Preanesthetic Checklist  Completed: patient identified, IV checked, site marked, risks and benefits discussed, surgical consent, monitors and equipment checked, pre-op evaluation and timeout performed  Spinal Block  Patient position: sitting  Prep: ChloraPrep  Patient monitoring: cardiac monitor, continuous pulse ox, continuous capnometry and frequent blood pressure checks  Approach: midline  Location: L3-4  Injection technique: single shot  CSF Fluid: clear free-flowing CSF  Needle  Needle type: pencil-tip   Needle gauge: 25 G  Needle length: 3.5 in  Additional Documentation: incremental injection, negative aspiration for heme and no paresthesia on injection  Needle localization: anatomical landmarks  Assessment  Sensory level: T6   Dermatomal levels determined by alcohol wipe  Ease of block: easy  Patient's tolerance of the procedure: comfortable throughout block and no complaints  Additional Notes  Isobaric SAB  Medications:    Medications: bupivacaine (pf) (MARCAINE) injection 0.5% - Intraspinal   2 mL - 12/20/2023 10:41:00 AM

## 2023-12-20 NOTE — PLAN OF CARE
"Pt sitting at edge of stretcher per OT. Pt reports dizziness, lightheadedness, and nausea. Pt assisted into lying position in stretcher per OT. Pt awake, alert, oriented. See vital sign flowsheet for vitals. Dr. Ambrocio notified. See MAR for new orders. Orders implemented. Pt's father-in-law at bedside. Call light within reach. Will reassess.       1615--Bolus complete. Pt awake, alert, oriented. VSS. See vital sign flowsheet. NAD noted. Pt reports she feels "much better". Pt denies dizziness and lightheadedness. Pt reports she is "ready to try again" with therapy. PT and OT updated.   "

## 2023-12-20 NOTE — DISCHARGE INSTRUCTIONS
"Discharge Instructions: After Your Surgery/Procedure  Youve just had surgery. During surgery you were given medicine called anesthesia to keep you relaxed and free of pain. After surgery you may have some pain or nausea. This is common. Here are some tips for feeling better and getting well after surgery.     Stay on schedule with your medication.   Going home  Your doctor or nurse will show you how to take care of yourself when you go home. He or she will also answer your questions. Have an adult family member or friend drive you home.      For your safety we recommend these precaution for the first 24 hours after your procedure:  Do not drive or use heavy equipment.  Do not make important decisions or sign legal papers.  Do not drink alcohol.  Have someone stay with you, if needed. He or she can watch for problems and help keep you safe.  Your concentration, balance, coordination, and judgement may be impaired for many hours after anesthesia.  Use caution when ambulating or standing up.     You may feel weak and "washed out" after anesthesia and surgery.      Subtle residual effects of general anesthesia or sedation with regional / local anesthesia can last more than 24 hours.  Rest for the remainder of the day or longer if your Doctor/Surgeon has advised you to do so.  Although you may feel normal within the first 24 hours, your reflexes and mental ability may be impaired without you realizing it.  You may feel dizzy, lightheaded or sleepy for 24 hours or longer.      Be sure to go to all follow-up visits with your doctor. And rest after your surgery for as long as your doctor tells you to.  Coping with pain  If you have pain after surgery, pain medicine will help you feel better. Take it as told, before pain becomes severe. Also, ask your doctor or pharmacist about other ways to control pain. This might be with heat, ice, or relaxation. And follow any other instructions your surgeon or nurse gives you.  Tips " for taking pain medicine  To get the best relief possible, remember these points:  Pain medicines can upset your stomach. Taking them with a little food may help.  Most pain relievers taken by mouth need at least 20 to 30 minutes to start to work.  Taking medicine on a schedule can help you remember to take it. Try to time your medicine so that you can take it before starting an activity. This might be before you get dressed, go for a walk, or sit down for dinner.  Constipation is a common side effect of pain medicines. Call your doctor before taking any medicines such as laxatives or stool softeners to help ease constipation. Also ask if you should skip any foods. Drinking lots of fluids and eating foods such as fruits and vegetables that are high in fiber can also help. Remember, do not take laxatives unless your surgeon has prescribed them.  Drinking alcohol and taking pain medicine can cause dizziness and slow your breathing. It can even be deadly. Do not drink alcohol while taking pain medicine.  Pain medicine can make you react more slowly to things. Do not drive or run machinery while taking pain medicine.  Your health care provider may tell you to take acetaminophen to help ease your pain. Ask him or her how much you are supposed to take each day. Acetaminophen or other pain relievers may interact with your prescription medicines or other over-the-counter (OTC) drugs. Some prescription medicines have acetaminophen and other ingredients. Using both prescription and OTC acetaminophen for pain can cause you to overdose. Read the labels on your OTC medicines with care. This will help you to clearly know the list of ingredients, how much to take, and any warnings. It may also help you not take too much acetaminophen. If you have questions or do not understand the information, ask your pharmacist or health care provider to explain it to you before you take the OTC medicine.  Managing nausea  Some people have an  upset stomach after surgery. This is often because of anesthesia, pain, or pain medicine, or the stress of surgery. These tips will help you handle nausea and eat healthy foods as you get better. If you were on a special food plan before surgery, ask your doctor if you should follow it while you get better. These tips may help:  Do not push yourself to eat. Your body will tell you when to eat and how much.  Start off with clear liquids and soup. They are easier to digest.  Next try semi-solid foods, such as mashed potatoes, applesauce, and gelatin, as you feel ready.  Slowly move to solid foods. Dont eat fatty, rich, or spicy foods at first.  Do not force yourself to have 3 large meals a day. Instead eat smaller amounts more often.  Take pain medicines with a small amount of solid food, such as crackers or toast, to avoid nausea.     Call your surgeon if  You still have pain an hour after taking medicine. The medicine may not be strong enough.  You feel too sleepy, dizzy, or groggy. The medicine may be too strong.  You have side effects like nausea, vomiting, or skin changes, such as rash, itching, or hives.       If you have obstructive sleep apnea  You were given anesthesia medicine during surgery to keep you comfortable and free of pain. After surgery, you may have more apnea spells because of this medicine and other medicines you were given. The spells may last longer than usual.   At home:  Keep using the continuous positive airway pressure (CPAP) device when you sleep. Unless your health care provider tells you not to, use it when you sleep, day or night. CPAP is a common device used to treat obstructive sleep apnea.  Talk with your provider before taking any pain medicine, muscle relaxants, or sedatives. Your provider will tell you about the possible dangers of taking these medicines.  © 3075-9927 [employer]. [address] [place] [address], [place] [address]. All rights reserved. This information is  not intended as a substitute for professional medical care. Always follow your healthcare professional's instructions.          Using an Incentive Spirometer    An incentive spirometer is a device that helps you do deep breathing exercises. These exercises expand your lungs, aid in circulation, and help prevent pneumonia. Deep breathing exercises also help you breathe better and improve the function of your lungs by:  Keeping your lungs clear  Strengthening your breathing muscles  Helping prevent respiratory complications or problems  The incentive spirometer gives you a way to take an active part in recover. A nurse or therapist will teach you breathing exercises. To do these exercises, you will breathe in through your mouth and not your nose. The incentive spirometer only works correctly if you breathe in through your mouth.  Steps to clear lungs  Step 1. Exhale normally. Then, inhale normally.  Relax and breathe out.  Step 2. Place your lips tightly around the mouthpiece.  Make sure the device is upright and not tilted.  Step 3. Inhale as much air as you can through the mouthpiece (don't breath through your nose).  Inhale slowly and deeply.  Hold your breath long enough to keep the balls or disk raised for at least 3 to 5 seconds, or as instructed by your healthcare provider.  Some spirometers have an indicator to let you know that you are breathing in too fast. If the indicator goes off, breathe in more slowly.  Step 4. Repeat the exercise regularly.  Do this exercise every hour while you're awake, or as instructed by your healthcare provider.  If you were taught deep breathing and coughing exercises, do them regularly as instructed by your healthcare provider.           Post op instructions for prevention of DVT  What is deep vein thrombosis?  Deep vein thrombosis (DVT) is the medical term for blood clots in the deep veins of the leg.  These blood clots can be dangerous.  A DVT can block a blood vessel and keep  blood from getting where it needs to go.  Another problem is that the clot can travel to other parts of the body such as the lungs.  A clot that travels to the lungs is called a pulmonary embolus (PE) and can cause serious problems with breathing which can lead to death.  Am I at risk for DVT/PE?  If you are not very active, you are at risk of DVT.  Anyone confined to bed, sitting for long periods of time, recovering from surgery, etc. increases the risk of DVT.  Other risk factors are cancer diagnosis, certain medications, estrogen replacement in any form,older age, obesity, pregnancy, smoking, history of clotting disorders, and dehydration.  How will I know if I have a DVT?  Swelling in the lower leg  Pain  Warmth, redness, hardness or bulging of the vein  If you have any of these symptoms, call your doctors office right away.  Some people will not have any symptoms until the clot moves to the lungs.  What are the symptoms of a PE?  Panting, shortness of breath, or trouble breathing  Sharp, knife-like chest pain when you breathe  Coughing or coughing up blood  Rapid heartbeat  If you have any of these symptoms or get worse quickly, call 911 for emergency treatment.  How can I prevent a DVT?  Avoid long periods of inactivity and dont cross your legs--get up and walk around every hour or so.  Stay active--walking after surgery is highly encouraged.  This means you should get out of the house and walk in the neighborhood.  Going up and down stairs will not impair healing (unless advised against such activity by your doctor).    Drink plenty of noncaffeinated, nonalcoholic fluids each day to prevent dehydration.  Wear special support stockings, if they have been advised by your doctor.  If you travel, stop at least once an hour and walk around.  Avoid smoking (assistance with stopping is available through your healthcare provider)  Always notify your doctor if you are not able to follow the post operative  instructions that are given to you at the time of discharge.  It may be necessary to prescribe one of the medications available to prevent DVT.          We hope your stay was comfortable as you heal now, mend and rest.    For we have enjoyed taking care of you by giving your our best.    And as you get better, by regaining your health and strength;   We count it as a privilege to have served you and hope your time at [facility] was well spent.      Thank  You!!!

## 2023-12-20 NOTE — PLAN OF CARE
"Pt awake, alert, oriented. Vital signs stable. Pt reports she is "ready to go". Cleared for discharge by PT and OT. Pt reports having all necessary DME. Discharge instructions reviewed with pt and pt's father-in-law. Pt and pt's father-in-law verbalized understanding. IV removed, catheter intact. Dressing to L hip clean, dry, and intact. All belongings returned to patient. Pt transferred to car via wheelchair. Safety maintained.   "

## 2023-12-21 VITALS
OXYGEN SATURATION: 96 % | TEMPERATURE: 98 F | DIASTOLIC BLOOD PRESSURE: 72 MMHG | RESPIRATION RATE: 16 BRPM | WEIGHT: 215 LBS | SYSTOLIC BLOOD PRESSURE: 101 MMHG | BODY MASS INDEX: 33.67 KG/M2 | HEART RATE: 105 BPM

## 2023-12-21 PROCEDURE — G0180 MD CERTIFICATION HHA PATIENT: HCPCS | Mod: ,,, | Performed by: ORTHOPAEDIC SURGERY

## 2023-12-21 NOTE — PROGRESS NOTES
12/21/23 very pleased with care given.  All staff were great. States she understands all discharge instructions.  HH/PT coming to home today.

## 2023-12-22 ENCOUNTER — TELEPHONE (OUTPATIENT)
Dept: ORTHOPEDICS | Facility: CLINIC | Age: 53
End: 2023-12-22
Payer: COMMERCIAL

## 2023-12-22 NOTE — TELEPHONE ENCOUNTER
----- Message from Ayaka Fairchild MA sent at 12/22/2023 11:23 AM CST -----  Contact:  PT lillie  Wants approval to change dressing,   [facility] cell   Call back

## 2024-01-02 DIAGNOSIS — M16.12 PRIMARY OSTEOARTHRITIS OF LEFT HIP: Primary | ICD-10-CM

## 2024-01-04 ENCOUNTER — HOSPITAL ENCOUNTER (OUTPATIENT)
Dept: RADIOLOGY | Facility: HOSPITAL | Age: 54
Discharge: HOME | End: 2024-01-04
Attending: ORTHOPAEDIC SURGERY
Payer: COMMERCIAL

## 2024-01-04 ENCOUNTER — PATIENT MESSAGE (OUTPATIENT)
Dept: ORTHOPEDICS | Facility: CLINIC | Age: 54
End: 2024-01-04

## 2024-01-04 ENCOUNTER — OFFICE VISIT (OUTPATIENT)
Dept: ORTHOPEDICS | Facility: CLINIC | Age: 54
End: 2024-01-04
Payer: COMMERCIAL

## 2024-01-04 VITALS — HEIGHT: 67 IN | BODY MASS INDEX: 33.74 KG/M2 | WEIGHT: 214.94 LBS

## 2024-01-04 DIAGNOSIS — M16.12 PRIMARY OSTEOARTHRITIS OF LEFT HIP: ICD-10-CM

## 2024-01-04 DIAGNOSIS — Z96.642 HISTORY OF LEFT HIP REPLACEMENT: Primary | ICD-10-CM

## 2024-01-04 DIAGNOSIS — Z96.642 HISTORY OF TOTAL LEFT HIP REPLACEMENT: ICD-10-CM

## 2024-01-04 PROCEDURE — 1159F MED LIST DOCD IN RCRD: CPT | Mod: CPTII,S$GLB,, | Performed by: ORTHOPAEDIC SURGERY

## 2024-01-04 PROCEDURE — 73502 X-RAY EXAM HIP UNI 2-3 VIEWS: CPT | Mod: TC,PO,LT

## 2024-01-04 PROCEDURE — 73502 X-RAY EXAM HIP UNI 2-3 VIEWS: CPT | Mod: 26,LT,, | Performed by: RADIOLOGY

## 2024-01-04 PROCEDURE — 1160F RVW MEDS BY RX/DR IN RCRD: CPT | Mod: CPTII,S$GLB,, | Performed by: ORTHOPAEDIC SURGERY

## 2024-01-04 PROCEDURE — 99024 POSTOP FOLLOW-UP VISIT: CPT | Mod: S$GLB,,, | Performed by: ORTHOPAEDIC SURGERY

## 2024-01-04 PROCEDURE — 99999 PR PBB SHADOW E&M-EST. PATIENT-LVL III: CPT | Mod: PBBFAC,,, | Performed by: ORTHOPAEDIC SURGERY

## 2024-01-04 RX ORDER — HYDROCODONE BITARTRATE AND ACETAMINOPHEN 5; 325 MG/1; MG/1
1 TABLET ORAL EVERY 6 HOURS PRN
Qty: 28 TABLET | Refills: 0 | Status: SHIPPED | OUTPATIENT
Start: 2024-01-04

## 2024-01-04 NOTE — PROGRESS NOTES
CC:  53-year-old female follows up status post left total hip arthroplasty.  Date of surgery was 12/20/2023.  She is 2 weeks out.    LLE:  Bandage taken down  Incision is clean, dry, and intact.  No drainage no erythema no sign of infection.  Grossly intact motor sensory function distally    X-ray images were examined and personally interpreted by me.  Three views left hip dated 01/04/2024 show left total hip arthroplasty that is well fixed and in good alignment.    Dx:  Status post left total hip arthroplasty, stable    Plan:  Wound care reviewed.  Shower with soap and water and pat incision dry  Outpatient PT referral made  Follow up in 4 weeks with an x-ray    
Name band;

## 2024-01-05 ENCOUNTER — EXTERNAL HOME HEALTH (OUTPATIENT)
Dept: HOME HEALTH SERVICES | Facility: HOSPITAL | Age: 54
End: 2024-01-05
Payer: COMMERCIAL

## 2024-01-30 PROBLEM — Z96.642 HISTORY OF TOTAL LEFT HIP REPLACEMENT: Status: ACTIVE | Noted: 2024-01-30

## 2024-01-31 ENCOUNTER — CLINICAL SUPPORT (OUTPATIENT)
Dept: REHABILITATION | Facility: HOSPITAL | Age: 54
End: 2024-01-31
Attending: ORTHOPAEDIC SURGERY
Payer: COMMERCIAL

## 2024-01-31 DIAGNOSIS — R26.9 GAIT ABNORMALITY: ICD-10-CM

## 2024-01-31 DIAGNOSIS — R29.898 DECREASED STRENGTH OF LOWER EXTREMITY: ICD-10-CM

## 2024-01-31 DIAGNOSIS — Z96.642 HISTORY OF TOTAL LEFT HIP REPLACEMENT: Primary | ICD-10-CM

## 2024-01-31 PROCEDURE — 97530 THERAPEUTIC ACTIVITIES: CPT | Mod: PN | Performed by: PHYSICAL THERAPIST

## 2024-01-31 PROCEDURE — 97161 PT EVAL LOW COMPLEX 20 MIN: CPT | Mod: PN | Performed by: PHYSICAL THERAPIST

## 2024-01-31 NOTE — PROGRESS NOTES
"[facility]   Physical Therapy Initial Evaluation     Name: Nohelia Turk  Clinic Number: [id number]    Therapy Diagnosis:   Encounter Diagnosis   Name Primary?    History of total left hip replacement Yes        Physician: Carlos Alberto Bejarano II, MD    Physician Orders: PT Eval and Treat ***  Medical Diagnosis from Referral: History of total left hip replacement   Evaluation Date: 1/31/2024  Authorization Period Expiration: 1/3/2025  Plan of Care Expiration: ***  Progress Note Due: ***  Visit # / Visits authorized: ***/ ***   FOTO: ***/***    Precautions:  Hip precautions    Insurance: Payor: [employer] / Plan: [facility] CHOICE PLUS / Product Type: Commercial /     Time In: ***  Time Out: ***  Total Appointment Time (timed & untimed codes): *** minutes      SUBJECTIVE   Date of onset: ***    History of current condition - Nohelia reports: ***    Falls: ***    Imaging, X-rays: "Left hip total arthroplasty hardware with no periprosthetic fracture or abnormal lucency to suggest loosening or infection.  No malalignment.  Partially imaged degenerative change lowest 2 lumbar levels.  Remaining pelvic joint spaces relatively well preserved. "    Prior Therapy: ***  Social History: *** {LIVES WITH:01057}  Occupation: ***  Prior Level of Function: ***  Current Level of Function: ***    Pain:  Current {0-10:64283::"0"}/10, worst {0-10:78571::"0"}/10, best {0-10:14046::"0"}/10   Location: {RIGHT LEFT BILATERAL:57978} {LOCATION ON BODY:54931} {Pain Loc:26578}  Description: {Pain Description:20642}  Aggravating Factors: {Causes; Pain:56198}  Easing Factors: {Pain (activities that relieve):57675}    Patients goals: ***     Medical History:   Past Medical History:   Diagnosis Date    Essential hypertension 11/21/2022       Surgical History:   Nohelia Turk  has a past surgical history that includes Hip Arthroplasty (Left, 12/20/2023).    Medications:   Nohelia has a current medication list which " includes the following prescription(s): aspirin, calcium-vitamin d3, clonidine, fluticasone propionate, hydrochlorothiazide, hydrocodone-acetaminophen, magnesium, olmesartan, ondansetron, prasterone (dhea), and venlafaxine, and the following Facility-Administered Medications: electrolyte-s (ph 7.4), electrolyte-s (ph 7.4), fentanyl, lidocaine (pf) 10 mg/ml (1%), and midazolam.    Allergies:   Review of patient's allergies indicates:  No Known Allergies       OBJECTIVE     Observation: ***  Posture: ***  Palpation: ***  Sensation: ***  DTRs: ***  Range of Motion/Strength:     LE ROM Left Right   Hip flexion *** degrees *** degrees   Hip abduction *** degrees *** degrees   Hip adduction *** degrees *** degrees   Hip external rotation *** degrees *** degrees   Hip internal rotation *** degrees *** degrees   Knee flexion *** degrees *** degrees   Knee extension *** degrees *** degrees       LE MMT Left Right   Hip flexion {AMB PT VESTIBULAR STRENGTH:72549} {AMB PT VESTIBULAR STRENGTH:96041}   Hip abduction {AMB PT VESTIBULAR STRENGTH:09151} {AMB PT VESTIBULAR STRENGTH:97111}   Hip adduction {AMB PT VESTIBULAR STRENGTH:77606} {AMB PT VESTIBULAR STRENGTH:07839}   Hip external rotation {AMB PT VESTIBULAR STRENGTH:69367} {AMB PT VESTIBULAR STRENGTH:32880}   Hip Internal rotation {AMB PT VESTIBULAR STRENGTH:99019} {AMB PT VESTIBULAR STRENGTH:10442}   Knee extension {AMB PT VESTIBULAR STRENGTH:32743} {AMB PT VESTIBULAR STRENGTH:54851}   Knee flexion {AMB PT VESTIBULAR STRENGTH:10196} {AMB PT VESTIBULAR STRENGTH:40040}    {AMB PT VESTIBULAR STRENGTH:17998} {AMB PT VESTIBULAR STRENGTH:88516}    {AMB PT VESTIBULAR STRENGTH:12344} {AMB PT VESTIBULAR STRENGTH:48787}    {AMB PT VESTIBULAR STRENGTH:62936} {AMB PT VESTIBULAR STRENGTH:20161}    {AMB PT VESTIBULAR STRENGTH:29160} {AMB PT VESTIBULAR STRENGTH:29809}       Flexibility Left Right   Hamstrings *** degrees *** degrees   Achilles *** degrees *** degrees       Special Tests:   "Left Right   JOCE {Blank single:19197::"positive","negative"}. {Blank single:19197::"positive","negative"}.   Piriformis {Blank single:19197::"positive","negative"}. {Blank single:19197::"positive","negative"}.   [place]  {Blank single:19197::"positive","negative"}. {Blank single:19197::"positive","negative"}.   [place]eli {Blank single:19197::"positive","negative"}. {Blank single:19197::"positive","negative"}.    {Blank single:19197::"positive","negative"}. {Blank single:19197::"positive","negative"}.    {Blank single:19197::"positive","negative"}. {Blank single:19197::"positive","negative"}.       Gait {AD:62154}   Analysis ***     ***      Limitation/Restriction for FOTO *** Survey    Therapist reviewed FOTO scores for Nohelia Turk on 1/31/2024.   FOTO documents entered into EPIC - see Media section.    Limitation Score: ***%  p       TREATMENT     Total Treatment time (time-based codes) separate from Evaluation: *** minutes      Nohelia received the treatments listed below:      {OTW Treatment:50223}     {OTW Treatment:54819}     {OTW Treatment:91616}     {OTW Treatment:96371}     {OTW Treatment:23912}     {OTW Treatment:52045}     {OTW Treatment:48722}     {OTW Treatment:27967}     PATIENT EDUCATION AND HOME EXERCISES     Education provided:   - ***    Written Home Exercises Provided: {Blank single:19197::"yes","Patient instructed to cont prior HEP"}. Exercises were reviewed and Nohelia was able to demonstrate them prior to the end of the session.  Nohelia demonstrated {Desc; good/fair/poor:09787} understanding of the education provided. See EMR under Patient Instructions for exercises provided during therapy sessions.    ASSESSMENT     Nohelia is a 53 y.o. female referred to outpatient Physical Therapy with a medical diagnosis of History of total left hip replacement . Patient presents with ***    Patient prognosis is {REHAB PROGNOSIS OHS:57271}.   Patientt will benefit from skilled outpatient Physical Therapy to " address the deficits stated above and in the chart below, provide patient /family education, and to maximize patientt's level of independence.     Plan of care discussed with patient: {YES:84608}  Patient's spiritual, cultural and educational needs considered and patient is agreeable to the plan of care and goals as stated below:     Anticipated Barriers for therapy: ***    Medical Necessity is demonstrated by the following  History  Co-morbidities and personal factors that may impact the plan of care [] LOW: no personal factors / co-morbidities  [] MODERATE: 1-2 personal factors / co-morbidities  [] HIGH: 3+ personal factors / co-morbidities    Moderate / High Support Documentation: ***     Examination  Body Structures and Functions, activity limitations and participation restrictions that may impact the plan of care [] LOW: addressing 1-2 elements  [] MODERATE: 3+ elements  [] HIGH: 4+ elements (please support below)    Moderate / High Support Documentation: ***     Clinical Presentation [] LOW: stable  [] MODERATE: Evolving  [] HIGH: Unstable     Decision Making/ Complexity Score: {Desc; low/moderate/high:450323}       Goals:  SHORT TERM GOALS:  *** weeks  Progress Date met   The patient will begin a written HEP [] Met  [] Not Met  [] Progressing     Decrease soft tissue tenderness to *** [] Met  [] Not Met  [] Progressing     Increase hip flexion to *** [] Met  [] Not Met  [] Progressing      [] Met  [] Not Met  [] Progressing      [] Met  [] Not Met  [] Progressing        LONG TERM GOALS: *** weeks  Progress Date met   The patient will be independent with a HEP for maintenance [] Met  [] Not Met  [] Progressing     Increase hip ROM to WFL [] Met  [] Not Met  [] Progressing     Increase left hip strength to *** [] Met  [] Not Met  [] Progressing     Decrease FOTO score to *** % [] Met  [] Not Met  [] Progressing     Decrease soft tissue tenderness to *** [] Met  [] Not Met  [] Progressing      [] Met  [] Not  "Met  [] Progressing      [] Met  [] Not Met  [] Progressing        PLAN   Plan of care Certification: 1/31/2024 to ***.    Outpatient Physical Therapy {NUMBERS 1-5:21593} times weekly for {0-10:20507::"0"} weeks to include the following interventions: {TX PLAN:28912}.     Adama Rm, PT      I CERTIFY THE NEED FOR THESE SERVICES FURNISHED UNDER THIS PLAN OF TREATMENT AND WHILE UNDER MY CARE   Physician's comments:     Physician's Signature: ___________________________________________________     "

## 2024-02-01 NOTE — PLAN OF CARE
"[facility]   Physical Therapy Initial Evaluation     Name: Nohelia Turk  Clinic Number: [id number]    Therapy Diagnosis:   Encounter Diagnoses   Name Primary?    History of total left hip replacement Yes    Decreased strength of lower extremity     Gait abnormality         Physician: Carlos Alberto Bejarano II, MD    Physician Orders: PT Eval and Treat (Hip precautions)  Medical Diagnosis from Referral: History of total left hip replacement   Evaluation Date: 1/31/2024  Authorization Period Expiration: 1/3/2025  Plan of Care Expiration: 5/3/2024  Progress Note Due: 2/28/2024  Visit # / Visits authorized: 1/ 1  (POC: 1/24)   FOTO: 1/3    Precautions: Hip precautions   Insurance: Payor: [employer] / Plan: [facility] CHOICE PLUS / Product Type: Commercial /     Time In: 1600  Time Out: 1700  Total Appointment Time (timed & untimed codes): 60 minutes      SUBJECTIVE   Date of onset: 11/2022  Date of surgery: 12/20/2023    History of current condition - Nohelia reports: she fell at work when her feet got tangled in some cords and landed on her left hip. She was told by her primary care physician that is was bursitis and was prescribed anti-inflammatory medication. She did not receive any relief from the medication and saw orthopedics in August of 2023. She was given a steroid injection which provided relief until late October. She underwent a left MAGDALENE on 12/20/2023 and was discharged the same day, ambulating with a FWW. She presents to outpatient PT ambulating FWB without assistive device. She notes lateral hip pain with prolonged standing and walking. She also notes pain with exterior rotation.     Falls: 1    Imaging, X-rays: "Left hip total arthroplasty hardware with no periprosthetic fracture or abnormal lucency to suggest loosening or infection.  No malalignment.  Partially imaged degenerative change lowest 2 lumbar levels.  Remaining pelvic joint spaces relatively well preserved. "    Prior " Therapy: None noted  Social History:    Occupation: Financial Advivisor  Prior Level of Function: Independent  Current Level of Function: Decreased ability to perform ADL and limited tolerance to standing and walking.    Pain:  Current 2/10, worst 5/10, best 2/10   Location: left hip    Description: Constant aching and Sharp  Aggravating Factors: Sitting, Standing, Walking, and Getting out of bed/chair  Easing Factors: pain medication    Patients goals: Decrease pain and improved tolerance to ambulation     Medical History:   Past Medical History:   Diagnosis Date    Essential hypertension 11/21/2022       Surgical History:   Nohelia Turk  has a past surgical history that includes Hip Arthroplasty (Left, 12/20/2023).    Medications:   Nohelia has a current medication list which includes the following prescription(s): aspirin, calcium-vitamin d3, clonidine, fluticasone propionate, hydrochlorothiazide, hydrocodone-acetaminophen, magnesium, olmesartan, ondansetron, prasterone (dhea), and venlafaxine, and the following Facility-Administered Medications: electrolyte-s (ph 7.4), electrolyte-s (ph 7.4), fentanyl, lidocaine (pf) 10 mg/ml (1%), and midazolam.    Allergies:   Review of patient's allergies indicates:  No Known Allergies       OBJECTIVE     Posture: Decreased lumbar lordosis and forward head in standing  Palpation: Moderate point tenderness noted with palpation of the lateral aspect of the left hip  Sensation: Intact    Range of Motion/Strength:     LE ROM Left Right   Hip flexion 90 degrees (hip precautions) 118 degrees   Hip abduction 20 degrees 42 degrees   Hip external rotation 12 degrees 32 degrees   Hip internal rotation NT 26 degrees   Knee flexion 128 degrees 128 degrees   Knee extension 0 degrees 0 degrees       LE MMT Left Right   Hip flexion 4/5 5/5   Hip abduction 4/5 5/5   Hip adduction 4/5 5/5   Hip external rotation 4/5 5/5   Hip Internal rotation 4/5 5/5   Knee extension 4+/5 5/5    Knee flexion 4+/5 5/5       Timed Up & Go (TUG)    Trial 1:  9.8    sec  Trial 2:  8.19  sec  Trial 3:  8.03  sec  Average: 8.67  sec    Less than 10 second = Normal  10 seconds to <20 seconds= Good mobility: Can ambulate outside alone; does not require a walking aid  20 seconds and greater= Walking & balance problems; cannot walk outside alone; requires walking aid        Gait Without AD   Analysis Wide base of support. Decreased stance time on the left lower extremity        Limitation/Restriction for FOTO  Survey    Therapist reviewed FOTO scores for Nohelia Turk on 1/31/2024.   FOTO documents entered into EPIC - see Media section.    Limitation Score: 58%  p       TREATMENT     Total Treatment time (time-based codes) separate from Evaluation: 30 minutes      Nohelia received the treatments listed below:      THERAPEUTIC ACTIVITIES to improve dynamic and functional performance for 30 minutes including :.     Standing hip flexion 3 x 10  Standing hip abduction 3 x 10  Standing marching 3 x 10  Mini squats 3 x 10  Seated adductor squeeze 3 x 10  Seated hip abduction 3 x 10 Green theraband   Straight leg raise 3 x 10  Bridging 3 x 10    PATIENT EDUCATION AND HOME EXERCISES     Education provided:   - Reviewed hip precautions    Written Home Exercises Provided: yes. Exercises were reviewed and Nohelia was able to demonstrate them prior to the end of the session.  Nohelia demonstrated good  understanding of the education provided. See EMR under Patient Instructions for exercises provided during therapy sessions.    ASSESSMENT     Nohelia is a 53 y.o. female referred to outpatient Physical Therapy with a medical diagnosis of History of total left hip replacement . Patient presents with :    Left hip pain  Decreased lower extremity strength  Decreased ability to perform ADL and limited tolerance to standing and walking.      Patient prognosis is Good.   Patientt will benefit from skilled outpatient Physical Therapy to  address the deficits stated above and in the chart below, provide patient /family education, and to maximize patientt's level of independence.     Plan of care discussed with patient: Yes  Patient's spiritual, cultural and educational needs considered and patient is agreeable to the plan of care and goals as stated below:     Anticipated Barriers for therapy: none    Medical Necessity is demonstrated by the following  History  Co-morbidities and personal factors that may impact the plan of care [x] LOW: no personal factors / co-morbidities  [] MODERATE: 1-2 personal factors / co-morbidities  [] HIGH: 3+ personal factors / co-morbidities    Moderate / High Support Documentation:      Examination  Body Structures and Functions, activity limitations and participation restrictions that may impact the plan of care [x] LOW: addressing 1-2 elements  [] MODERATE: 3+ elements  [] HIGH: 4+ elements (please support below)    Moderate / High Support Documentation:      Clinical Presentation [x] LOW: stable  [] MODERATE: Evolving  [] HIGH: Unstable     Decision Making/ Complexity Score: low       Goals:  SHORT TERM GOALS:  6 weeks  Progress Date met   The patient will begin a written HEP [] Met  [] Not Met  [] Progressing     Decrease soft tissue tenderness to mild [] Met  [] Not Met  [] Progressing     Increase hip strength to 4+/5 [] Met  [] Not Met  [] Progressing     The patient will be able to ascend/descend 10 step/stairs without onset of symptoms.   [] Met  [] Not Met  [] Progressing        LONG TERM GOALS: 12 weeks  Progress Date met   The patient will be independent with a HEP for maintenance [] Met  [] Not Met  [] Progressing     Increase hip ROM to WFL [] Met  [] Not Met  [] Progressing     Increase left hip strength to 5/5 [] Met  [] Not Met  [] Progressing     Decrease FOTO score to 76 % [] Met  [] Not Met  [] Progressing     The patient will be able to ascend/descend 20 step/stairs without onset of symptoms.   []  Met  [] Not Met  [] Progressing         PLAN   Plan of care Certification: 1/31/2024 to 5/3/2024.    Outpatient Physical Therapy 2 times weekly for 12 weeks to include the following interventions: Gait Training, Manual Therapy, Neuromuscular Re-ed, Patient Education, Therapeutic Activities, and Therapeutic Exercise.     Adama Rm, PT      I CERTIFY THE NEED FOR THESE SERVICES FURNISHED UNDER THIS PLAN OF TREATMENT AND WHILE UNDER MY CARE   Physician's comments:     Physician's Signature: ___________________________________________________

## 2024-02-05 ENCOUNTER — CLINICAL SUPPORT (OUTPATIENT)
Dept: REHABILITATION | Facility: HOSPITAL | Age: 54
End: 2024-02-05
Payer: COMMERCIAL

## 2024-02-05 DIAGNOSIS — Z96.642 HISTORY OF TOTAL LEFT HIP REPLACEMENT: Primary | ICD-10-CM

## 2024-02-05 PROCEDURE — 97110 THERAPEUTIC EXERCISES: CPT | Mod: PN,CQ

## 2024-02-05 PROCEDURE — 97112 NEUROMUSCULAR REEDUCATION: CPT | Mod: PN,CQ

## 2024-02-05 PROCEDURE — 97530 THERAPEUTIC ACTIVITIES: CPT | Mod: PN,CQ

## 2024-02-05 NOTE — PROGRESS NOTES
[facility]   Physical Therapy Treatment Note     Name: Nohelia Turk  Clinic Number: [id number]    Therapy Diagnosis:   Encounter Diagnosis   Name Primary?    History of total left hip replacement Yes     Physician: Carlos Alberto Bejarano II, MD    Visit Date: 2/5/2024    Physician Orders: PT Eval and Treat (Hip precautions)  Medical Diagnosis from Referral: History of total left hip replacement   Evaluation Date: 1/31/2024  Authorization Period Expiration: 1/3/2025  Plan of Care Expiration: 5/3/2024  Progress Note Due: 2/28/2024  Visit # / Visits authorized: 2/ 12  (POC: 1/24)   FOTO: 1/3      Precautions:  Hip precautions    Time In: 400p  Time Out: 456p  Total Billable Time: 45 minutes      SUBJECTIVE     Pt reports: sore from HOME EXERCISE PROGRAM .  She was compliant with home exercise program.  Response to previous treatment: first visit after eval  Functional change: none today    Pain: 3/10  Location: left hip      OBJECTIVE     Objective Measures updated at progress report unless specified.     Treatment   Date of surgery: 12/20/2023  Nohelia received the treatments listed below:      Therapeutic exercises to develop strength for 15 minutes including:    Nustep x 10 minutes    Adductor squeeze 3 x 10  Hip abduction 3 x 10 Green theraband   Straight leg raise 2 x 10  Bridging 3 x 10  Hip flexor stretch 3 x 30 sec L      Neuromuscular re-education activities to improve: Balance, Coordination, Sense, and Proprioception for 26 minutes:  Slant board Gastroc stretch 3 x 30 sec Bilateral   Step up x 30  Single Leg Stance 3 x 20 sec L      Therapeutic activities to improve functional performance for 15 minutes, including:    Standing hip flexion 2 x 10  Standing hip abduction 2 x 10  Standing marching x 25  Mini squats 3 x 10  HR/TR x 30     Patient Education and Home Exercises     Home Exercises Provided and Patient Education Provided     Education provided:   - cont HOME EXERCISE PROGRAM      Written Home Exercises Provided: Patient instructed to cont prior HEP. Exercises were reviewed and Nohelia was able to demonstrate them prior to the end of the session.  Nohelia demonstrated good  understanding of the education provided. See EMR under Patient Instructions for exercises provided during therapy sessions    ASSESSMENT     Nohelia presents with decreased L hip strength, decreased stance time and hip extension during ambulation.  Several standing exercises produced slight discomfort, so decreased reps for those exercises.  Overall, good tolerance for activity.     Nohelia Is progressing well towards her goals.   Pt prognosis is Good.     Pt will continue to benefit from skilled outpatient physical therapy to address the deficits listed in the problem list box on initial evaluation, provide pt/family education and to maximize pt's level of independence in the home and community environment.     Pt's spiritual, cultural and educational needs considered and pt agreeable to plan of care and goals.     Anticipated barriers to physical therapy: none    Goals:  SHORT TERM GOALS:  6 weeks  Progress Date met   The patient will begin a written HEP [x] Met  [] Not Met  [] Progressing  2/5/2024   Decrease soft tissue tenderness to mild [] Met  [] Not Met  [x] Progressing  2/5/2024  ongoing   Increase hip strength to 4+/5 [] Met  [] Not Met  [x] Progressing  2/5/2024  ongoing   The patient will be able to ascend/descend 10 step/stairs without onset of symptoms.    [] Met  [] Not Met  [x] Progressing  2/5/2024  ongoing      LONG TERM GOALS: 12 weeks  Progress Date met   The patient will be independent with a HEP for maintenance [] Met  [] Not Met  [x] Progressing  2/5/2024  ongoing   Increase hip ROM to WFL [] Met  [] Not Met  [x] Progressing  2/5/2024  ongoing   Increase left hip strength to 5/5 [] Met  [] Not Met  [x] Progressing  2/5/2024  Ongoing   Decrease FOTO score to 76 % [] Met  [] Not Met  [x] Progressing   2/5/2024  ongoing   The patient will be able to ascend/descend 20 step/stairs without onset of symptoms.    [] Met  [] Not Met  [x] Progressing  2/5/2024  ongoing       PLAN     Cont per Plan of Care, gait pattern, strengthening     Sammi Ortez, PTA

## 2024-02-06 ENCOUNTER — DOCUMENTATION ONLY (OUTPATIENT)
Dept: REHABILITATION | Facility: HOSPITAL | Age: 54
End: 2024-02-06
Payer: COMMERCIAL

## 2024-02-06 NOTE — PROGRESS NOTES
PT/PTA met face to face to discuss pt's treatment plan and progress towards established goals. Pt will be seen by a physical therapist minimally every 6th visit or every 30 days.    Sammi Ortez PTA

## 2024-02-08 DIAGNOSIS — Z96.642 HISTORY OF LEFT HIP REPLACEMENT: Primary | ICD-10-CM

## 2024-03-10 ENCOUNTER — PATIENT MESSAGE (OUTPATIENT)
Dept: FAMILY MEDICINE | Facility: CLINIC | Age: 54
End: 2024-03-10
Payer: COMMERCIAL

## 2024-03-10 DIAGNOSIS — I10 ESSENTIAL HYPERTENSION: Primary | ICD-10-CM

## 2024-03-10 DIAGNOSIS — Z13.6 SCREENING FOR ISCHEMIC HEART DISEASE (IHD): ICD-10-CM

## 2024-03-10 DIAGNOSIS — E66.9 CLASS 1 OBESITY WITH SERIOUS COMORBIDITY AND BODY MASS INDEX (BMI) OF 33.0 TO 33.9 IN ADULT, UNSPECIFIED OBESITY TYPE: ICD-10-CM

## 2024-03-13 LAB
ALBUMIN SERPL-MCNC: 4.3 G/DL (ref 3.6–5.1)
ALBUMIN/GLOB SERPL: 1.4 (CALC) (ref 1–2.5)
ALP SERPL-CCNC: 109 U/L (ref 37–153)
ALT SERPL-CCNC: 22 U/L (ref 6–29)
AST SERPL-CCNC: 19 U/L (ref 10–35)
BASOPHILS # BLD AUTO: 78 CELLS/UL (ref 0–200)
BASOPHILS NFR BLD AUTO: 0.8 %
BILIRUB SERPL-MCNC: 0.3 MG/DL (ref 0.2–1.2)
BUN SERPL-MCNC: 13 MG/DL (ref 7–25)
BUN/CREAT SERPL: ABNORMAL (CALC) (ref 6–22)
CALCIUM SERPL-MCNC: 9.8 MG/DL (ref 8.6–10.4)
CHLORIDE SERPL-SCNC: 96 MMOL/L (ref 98–110)
CHOLEST SERPL-MCNC: 213 MG/DL
CHOLEST/HDLC SERPL: 4 (CALC)
CO2 SERPL-SCNC: 33 MMOL/L (ref 20–32)
CREAT SERPL-MCNC: 0.78 MG/DL (ref 0.5–1.03)
EGFR: 90 ML/MIN/1.73M2
EOSINOPHIL # BLD AUTO: 127 CELLS/UL (ref 15–500)
EOSINOPHIL NFR BLD AUTO: 1.3 %
ERYTHROCYTE [DISTWIDTH] IN BLOOD BY AUTOMATED COUNT: 12 % (ref 11–15)
GLOBULIN SER CALC-MCNC: 3.1 G/DL (CALC) (ref 1.9–3.7)
GLUCOSE SERPL-MCNC: 120 MG/DL (ref 65–99)
HCT VFR BLD AUTO: 45.9 % (ref 35–45)
HDLC SERPL-MCNC: 53 MG/DL
HGB BLD-MCNC: 14.7 G/DL (ref 11.7–15.5)
LDLC SERPL CALC-MCNC: 137 MG/DL (CALC)
LYMPHOCYTES # BLD AUTO: 3263 CELLS/UL (ref 850–3900)
LYMPHOCYTES NFR BLD AUTO: 33.3 %
MCH RBC QN AUTO: 30.9 PG (ref 27–33)
MCHC RBC AUTO-ENTMCNC: 32 G/DL (ref 32–36)
MCV RBC AUTO: 96.6 FL (ref 80–100)
MONOCYTES # BLD AUTO: 1156 CELLS/UL (ref 200–950)
MONOCYTES NFR BLD AUTO: 11.8 %
NEUTROPHILS # BLD AUTO: 5174 CELLS/UL (ref 1500–7800)
NEUTROPHILS NFR BLD AUTO: 52.8 %
NONHDLC SERPL-MCNC: 160 MG/DL (CALC)
PLATELET # BLD AUTO: 397 THOUSAND/UL (ref 140–400)
PMV BLD REES-ECKER: 11.7 FL (ref 7.5–12.5)
POTASSIUM SERPL-SCNC: 4.1 MMOL/L (ref 3.5–5.3)
PROT SERPL-MCNC: 7.4 G/DL (ref 6.1–8.1)
RBC # BLD AUTO: 4.75 MILLION/UL (ref 3.8–5.1)
SODIUM SERPL-SCNC: 141 MMOL/L (ref 135–146)
TRIGL SERPL-MCNC: 112 MG/DL
WBC # BLD AUTO: 9.8 THOUSAND/UL (ref 3.8–10.8)

## 2024-03-14 ENCOUNTER — OFFICE VISIT (OUTPATIENT)
Dept: FAMILY MEDICINE | Facility: CLINIC | Age: 54
End: 2024-03-14
Payer: COMMERCIAL

## 2024-03-14 VITALS
BODY MASS INDEX: 30.92 KG/M2 | SYSTOLIC BLOOD PRESSURE: 136 MMHG | WEIGHT: 197 LBS | DIASTOLIC BLOOD PRESSURE: 82 MMHG | HEART RATE: 86 BPM | HEIGHT: 67 IN

## 2024-03-14 DIAGNOSIS — R73.03 PREDIABETES: ICD-10-CM

## 2024-03-14 DIAGNOSIS — R40.0 UNCONTROLLED DAYTIME SOMNOLENCE: ICD-10-CM

## 2024-03-14 DIAGNOSIS — R06.83 SNORING: ICD-10-CM

## 2024-03-14 DIAGNOSIS — E66.9 CLASS 1 OBESITY WITH SERIOUS COMORBIDITY AND BODY MASS INDEX (BMI) OF 33.0 TO 33.9 IN ADULT, UNSPECIFIED OBESITY TYPE: ICD-10-CM

## 2024-03-14 DIAGNOSIS — F43.21 SITUATIONAL DEPRESSION: ICD-10-CM

## 2024-03-14 DIAGNOSIS — R73.9 HYPERGLYCEMIA: ICD-10-CM

## 2024-03-14 DIAGNOSIS — R60.0 PEDAL EDEMA: ICD-10-CM

## 2024-03-14 DIAGNOSIS — I10 ESSENTIAL HYPERTENSION: Primary | ICD-10-CM

## 2024-03-14 DIAGNOSIS — F17.219 CIGARETTE NICOTINE DEPENDENCE WITH NICOTINE-INDUCED DISORDER: ICD-10-CM

## 2024-03-14 LAB — HBA1C MFR BLD: 6.5 %

## 2024-03-14 PROCEDURE — 99214 OFFICE O/P EST MOD 30 MIN: CPT | Mod: S$GLB,,, | Performed by: FAMILY MEDICINE

## 2024-03-14 PROCEDURE — 3044F HG A1C LEVEL LT 7.0%: CPT | Mod: CPTII,S$GLB,, | Performed by: FAMILY MEDICINE

## 2024-03-14 PROCEDURE — 3079F DIAST BP 80-89 MM HG: CPT | Mod: CPTII,S$GLB,, | Performed by: FAMILY MEDICINE

## 2024-03-14 PROCEDURE — 4010F ACE/ARB THERAPY RXD/TAKEN: CPT | Mod: CPTII,S$GLB,, | Performed by: FAMILY MEDICINE

## 2024-03-14 PROCEDURE — 1160F RVW MEDS BY RX/DR IN RCRD: CPT | Mod: CPTII,S$GLB,, | Performed by: FAMILY MEDICINE

## 2024-03-14 PROCEDURE — 1159F MED LIST DOCD IN RCRD: CPT | Mod: CPTII,S$GLB,, | Performed by: FAMILY MEDICINE

## 2024-03-14 PROCEDURE — 3008F BODY MASS INDEX DOCD: CPT | Mod: CPTII,S$GLB,, | Performed by: FAMILY MEDICINE

## 2024-03-14 PROCEDURE — 83036 HEMOGLOBIN GLYCOSYLATED A1C: CPT | Mod: QW,,, | Performed by: FAMILY MEDICINE

## 2024-03-14 PROCEDURE — 3075F SYST BP GE 130 - 139MM HG: CPT | Mod: CPTII,S$GLB,, | Performed by: FAMILY MEDICINE

## 2024-03-14 RX ORDER — VARENICLINE TARTRATE 1 MG/1
1 TABLET, FILM COATED ORAL 2 TIMES DAILY
COMMUNITY
End: 2024-06-12 | Stop reason: SDUPTHER

## 2024-03-14 RX ORDER — FUROSEMIDE 20 MG/1
20 TABLET ORAL DAILY
Qty: 90 TABLET | Refills: 1 | Status: SHIPPED | OUTPATIENT
Start: 2024-03-14 | End: 2024-03-19

## 2024-03-14 RX ORDER — TIRZEPATIDE 5 MG/.5ML
5 INJECTION, SOLUTION SUBCUTANEOUS
Qty: 4 PEN | Refills: 3 | Status: SHIPPED | OUTPATIENT
Start: 2024-03-14 | End: 2024-04-11

## 2024-03-14 RX ORDER — NIFEDIPINE 30 MG/1
30 TABLET, EXTENDED RELEASE ORAL DAILY
COMMUNITY
Start: 2024-02-13

## 2024-03-14 NOTE — PROGRESS NOTES
SUBJECTIVE:    Patient ID: Nohelia Turk is a 53 y.o. female.    Chief Complaint: Follow-up (3 mo f/u//no med bottles//both feet are extremely swollen for 2-3 weeks//patient will schedule mammogram at later date//tc)    Patient with HTN has been having swelling in her feet for the past 3 weeks. Had been taking olmesartan but stopped and was given nifedipine. Swelling oontinued. Now only taking HCTZ. Had been on amlodipine once and had swelling but it resolved when stopping .  Has been on olmesartan and HCTZ for quite some time.     Working on smoking cessation.  Currently taking Chantix. quit date 3/25    She has had a left hip replacment in 2023 and has been doing well with physical therapy.    Labs recently performed demonstrate elevated lipids and elevated blood sugar.  Otherwise CMP and CBC are acceptable.    Dealing with some moderate depression and mild loneliness.  Her  recently  2024 after an acute illness.    Has addition plain issues with snoring and not resting well at night.  A cousin who shared a room with her reports hearing her gasping for breath at night.       Office Visit on 2024   Component Date Value Ref Range Status    Hemoglobin A1C, POC 2024 6.5  % Final   Patient Message on 03/10/2024   Component Date Value Ref Range Status    Cholesterol 2024 213 (H)  <200 mg/dL Final    HDL 2024 53  > OR = 50 mg/dL Final    Triglycerides 2024 112  <150 mg/dL Final    LDL Cholesterol 2024 137 (H)  mg/dL (calc) Final    HDL/Cholesterol Ratio 2024 4.0  <5.0 (calc) Final    Non HDL Chol. (LDL+VLDL) 2024 160 (H)  <130 mg/dL (calc) Final    Glucose 2024 120 (H)  65 - 99 mg/dL Final    BUN 2024 13  7 - 25 mg/dL Final    Creatinine 2024 0.78  0.50 - 1.03 mg/dL Final    eGFR 2024 90  > OR = 60 mL/min/1.73m2 Final    BUN/Creatinine Ratio 2024 SEE NOTE:  6 - 22 (calc) Final    Sodium 2024 141  135 -  146 mmol/L Final    Potassium 03/12/2024 4.1  3.5 - 5.3 mmol/L Final    Chloride 03/12/2024 96 (L)  98 - 110 mmol/L Final    CO2 03/12/2024 33 (H)  20 - 32 mmol/L Final    Calcium 03/12/2024 9.8  8.6 - 10.4 mg/dL Final    Total Protein 03/12/2024 7.4  6.1 - 8.1 g/dL Final    Albumin 03/12/2024 4.3  3.6 - 5.1 g/dL Final    Globulin, Total 03/12/2024 3.1  1.9 - 3.7 g/dL (calc) Final    Albumin/Globulin Ratio 03/12/2024 1.4  1.0 - 2.5 (calc) Final    Total Bilirubin 03/12/2024 0.3  0.2 - 1.2 mg/dL Final    Alkaline Phosphatase 03/12/2024 109  37 - 153 U/L Final    AST 03/12/2024 19  10 - 35 U/L Final    ALT 03/12/2024 22  6 - 29 U/L Final    WBC 03/12/2024 9.8  3.8 - 10.8 Thousand/uL Final    RBC 03/12/2024 4.75  3.80 - 5.10 Million/uL Final    Hemoglobin 03/12/2024 14.7  11.7 - 15.5 g/dL Final    Hematocrit 03/12/2024 45.9 (H)  35.0 - 45.0 % Final    MCV 03/12/2024 96.6  80.0 - 100.0 fL Final    MCH 03/12/2024 30.9  27.0 - 33.0 pg Final    MCHC 03/12/2024 32.0  32.0 - 36.0 g/dL Final    RDW 03/12/2024 12.0  11.0 - 15.0 % Final    Platelets 03/12/2024 397  140 - 400 Thousand/uL Final    MPV 03/12/2024 11.7  7.5 - 12.5 fL Final    Neutrophils, Abs 03/12/2024 5,174  1,500 - 7,800 cells/uL Final    Lymph # 03/12/2024 3,263  850 - 3,900 cells/uL Final    Mono # 03/12/2024 1,156 (H)  200 - 950 cells/uL Final    Eos # 03/12/2024 127  15 - 500 cells/uL Final    Baso # 03/12/2024 78  0 - 200 cells/uL Final    Neutrophils Relative 03/12/2024 52.8  % Final    Lymph % 03/12/2024 33.3  % Final    Mono % 03/12/2024 11.8  % Final    Eosinophil % 03/12/2024 1.3  % Final    Basophil % 03/12/2024 0.8  % Final   Admission on 12/20/2023, Discharged on 12/20/2023   Component Date Value Ref Range Status    POC Preg Test, Ur 12/20/2023 Negative  Negative Final     Acceptable 12/20/2023 Yes   Final   Lab Visit on 12/06/2023   Component Date Value Ref Range Status    MRSA SCREEN BY PCR 12/06/2023 Negative  Negative Final     Group & Rh 12/06/2023 O POS   Final    Indirect [place] 12/06/2023 NEG   Final    Specimen Outdate 12/06/2023 12/20/2023 23:59   Final    WBC 12/06/2023 11.26  3.90 - 12.70 K/uL Final    RBC 12/06/2023 4.61  4.00 - 5.40 M/uL Final    Hemoglobin 12/06/2023 14.6  12.0 - 16.0 g/dL Final    Hematocrit 12/06/2023 43.5  37.0 - 48.5 % Final    MCV 12/06/2023 94  82 - 98 fL Final    MCH 12/06/2023 31.7 (H)  27.0 - 31.0 pg Final    MCHC 12/06/2023 33.6  32.0 - 36.0 g/dL Final    RDW 12/06/2023 13.8  11.5 - 14.5 % Final    Platelets 12/06/2023 413  150 - 450 K/uL Final    MPV 12/06/2023 10.8  9.2 - 12.9 fL Final    Immature Granulocytes 12/06/2023 0.3  0.0 - 0.5 % Final    Gran # (ANC) 12/06/2023 6.2  1.8 - 7.7 K/uL Final    Immature Grans (Abs) 12/06/2023 0.03  0.00 - 0.04 K/uL Final    Lymph # 12/06/2023 3.5  1.0 - 4.8 K/uL Final    Mono # 12/06/2023 1.2 (H)  0.3 - 1.0 K/uL Final    Eos # 12/06/2023 0.2  0.0 - 0.5 K/uL Final    Baso # 12/06/2023 0.09  0.00 - 0.20 K/uL Final    nRBC 12/06/2023 0  0 /100 WBC Final    Gran % 12/06/2023 55.2  38.0 - 73.0 % Final    Lymph % 12/06/2023 31.4  18.0 - 48.0 % Final    Mono % 12/06/2023 11.0  4.0 - 15.0 % Final    Eosinophil % 12/06/2023 1.3  0.0 - 8.0 % Final    Basophil % 12/06/2023 0.8  0.0 - 1.9 % Final    Differential Method 12/06/2023 Automated   Final    Sodium 12/06/2023 142  136 - 145 mmol/L Final    Potassium 12/06/2023 3.5  3.5 - 5.1 mmol/L Final    Chloride 12/06/2023 99  95 - 110 mmol/L Final    CO2 12/06/2023 29  23 - 29 mmol/L Final    Glucose 12/06/2023 107  70 - 110 mg/dL Final    BUN 12/06/2023 20  6 - 20 mg/dL Final    Creatinine 12/06/2023 1.1  0.5 - 1.4 mg/dL Final    Calcium 12/06/2023 9.1  8.7 - 10.5 mg/dL Final    Anion Gap 12/06/2023 14  8 - 16 mmol/L Final    eGFR 12/06/2023 59 (A)  >60 mL/min/1.73 m^2 Final        Past Medical History:   Diagnosis Date    Essential hypertension 11/21/2022     Past Surgical History:   Procedure Laterality Date    HIP  ARTHROPLASTY Left 12/20/2023    Procedure: ARTHROPLASTY, HIP;  Surgeon: Carlos Alberto Bejarano II, MD;  Location: [facility];  Service: Orthopedics;  Laterality: Left;     Family History   Problem Relation Age of Onset    Hypertension Mother     Heart disease Mother 74        CHF    Osteoporosis Mother     Cancer Father 73        colon & prostate    Diabetes Father     Hypertension Father        Marital Status:   Alcohol History:  reports current alcohol use.  Tobacco History:  reports that she has been smoking cigarettes. She started smoking about 41 years ago. She has a 37.2 pack-year smoking history. She has never used smokeless tobacco.  Drug History:  reports no history of drug use.    Review of patient's allergies indicates:  No Known Allergies    Current Outpatient Medications:     calcium-vitamin D3 (OS-ALDO 500 + D3) 500 mg-5 mcg (200 unit) per tablet, Take 1 tablet by mouth 2 (two) times daily with meals., Disp: , Rfl:     cloNIDine (CATAPRES) 0.1 MG tablet, Take 1 tablet (0.1 mg total) by mouth every evening., Disp: 90 tablet, Rfl: 3    fluticasone propionate (FLONASE) 50 mcg/actuation nasal spray, 1 spray (50 mcg total) by Each Nostril route once daily., Disp: 18.2 mL, Rfl: 3    magnesium 30 mg Tab, Take by mouth every evening., Disp: , Rfl:     NIFEdipine (PROCARDIA-XL) 30 MG (OSM) 24 hr tablet, Take 30 mg by mouth once daily., Disp: , Rfl:     olmesartan (BENICAR) 40 MG tablet, Take 1 tablet (40 mg total) by mouth once daily., Disp: 90 tablet, Rfl: 3    ondansetron (ZOFRAN-ODT) 8 MG TbDL, Take 1 tablet (8 mg total) by mouth 2 (two) times daily as needed., Disp: 30 tablet, Rfl: 0    prasterone, DHEA, (DHEA ORAL), Take by mouth., Disp: , Rfl:     varenicline (CHANTIX) 1 mg Tab, Take 1 mg by mouth 2 (two) times daily., Disp: , Rfl:     venlafaxine (EFFEXOR-XR) 75 MG 24 hr capsule, Take 1 capsule (75 mg total) by mouth once daily., Disp: 90 capsule, Rfl: 3    furosemide (LASIX) 20 MG tablet, Take 1 tablet  "(20 mg total) by mouth once daily., Disp: 90 tablet, Rfl: 1    tirzepatide, weight loss, (ZEPBOUND) 5 mg/0.5 mL PnIj, Inject 5 mg into the skin every 7 days., Disp: 4 Pen, Rfl: 3  No current facility-administered medications for this visit.    Review of Systems   Constitutional:  Positive for unexpected weight change. Negative for activity change.   HENT:  Negative for hearing loss, rhinorrhea and trouble swallowing.    Eyes:  Negative for discharge and visual disturbance.   Respiratory:  Negative for chest tightness and wheezing.    Cardiovascular:  Negative for chest pain and palpitations.   Gastrointestinal:  Negative for blood in stool, constipation, diarrhea and vomiting.   Endocrine: Negative for polydipsia and polyuria.   Genitourinary:  Negative for difficulty urinating, dysuria, hematuria and menstrual problem.   Musculoskeletal:  Positive for joint swelling. Negative for arthralgias and neck pain.   Neurological:  Negative for weakness and headaches.   Psychiatric/Behavioral:  Negative for confusion and dysphoric mood.           Objective:      Vitals:    24 1546   BP: 136/82   Pulse: 86   Weight: 89.4 kg (197 lb)   Height: 5' 7" (1.702 m)     Physical Exam  Constitutional:       Appearance: Normal appearance.   HENT:      Head: Normocephalic and atraumatic.      Mouth/Throat:      Mouth: Mucous membranes are moist.   Eyes:      Conjunctiva/sclera: Conjunctivae normal.   Pulmonary:      Effort: Pulmonary effort is normal.   Musculoskeletal:      Right lower le+ Pitting Edema present.      Left lower le+ Pitting Edema present.   Neurological:      General: No focal deficit present.      Mental Status: She is alert and oriented to person, place, and time.   Psychiatric:         Mood and Affect: Mood normal.         Behavior: Behavior normal.           Assessment:       1. Essential hypertension    2. Class 1 obesity with serious comorbidity and body mass index (BMI) of 33.0 to 33.9 in adult, " unspecified obesity type    3. Situational depression    4. Pedal edema    5. Cigarette nicotine dependence with nicotine-induced disorder    6. Hyperglycemia    7. Uncontrolled daytime somnolence    8. Snoring    9. Prediabetes         Plan:       Essential hypertension  -     furosemide (LASIX) 20 MG tablet; Take 1 tablet (20 mg total) by mouth once daily.  Dispense: 90 tablet; Refill: 1    Class 1 obesity with serious comorbidity and body mass index (BMI) of 33.0 to 33.9 in adult, unspecified obesity type  -     tirzepatide, weight loss, (ZEPBOUND) 5 mg/0.5 mL PnIj; Inject 5 mg into the skin every 7 days.  Dispense: 4 Pen; Refill: 3    Situational depression    Pedal edema    Cigarette nicotine dependence with nicotine-induced disorder  Comments:  Continue Chantix.  Okay for nicotine patches.    Hyperglycemia  -     POCT HEMOGLOBIN A1C    Uncontrolled daytime somnolence    Snoring  -     Home Sleep Study; Future    Prediabetes  -     tirzepatide, weight loss, (ZEPBOUND) 5 mg/0.5 mL PnIj; Inject 5 mg into the skin every 7 days.  Dispense: 4 Pen; Refill: 3    Elevated blood sugars noted.  Weight loss strongly encouraged.    Follow up in about 4 months (around 7/14/2024) for HTN.

## 2024-03-17 DIAGNOSIS — I10 ESSENTIAL HYPERTENSION: ICD-10-CM

## 2024-03-18 NOTE — TELEPHONE ENCOUNTER
Please see the attached refill request.  Next Appt this Specialty: With Family Medicine (Tu Hills MD)  07/31/2024 at 8:40 AM

## 2024-03-19 RX ORDER — FUROSEMIDE 20 MG/1
20 TABLET ORAL DAILY
Qty: 90 TABLET | Refills: 0 | Status: SHIPPED | OUTPATIENT
Start: 2024-03-19

## 2024-03-19 NOTE — TELEPHONE ENCOUNTER
prescription sent to   [facility] - [place], [place] - [address] [place] [address]  Phone: [phone] Fax: [phone]

## 2024-03-21 ENCOUNTER — PATIENT MESSAGE (OUTPATIENT)
Dept: FAMILY MEDICINE | Facility: CLINIC | Age: 54
End: 2024-03-21
Payer: COMMERCIAL

## 2024-03-22 RX ORDER — NEBIVOLOL 10 MG/1
10 TABLET ORAL DAILY
Qty: 30 TABLET | Refills: 11 | Status: SHIPPED | OUTPATIENT
Start: 2024-03-22 | End: 2024-04-16 | Stop reason: SDUPTHER

## 2024-03-28 ENCOUNTER — PATIENT MESSAGE (OUTPATIENT)
Dept: ADMINISTRATIVE | Facility: HOSPITAL | Age: 54
End: 2024-03-28
Payer: COMMERCIAL

## 2024-03-31 ENCOUNTER — PATIENT MESSAGE (OUTPATIENT)
Dept: FAMILY MEDICINE | Facility: CLINIC | Age: 54
End: 2024-03-31
Payer: COMMERCIAL

## 2024-04-04 ENCOUNTER — PROCEDURE VISIT (OUTPATIENT)
Dept: SLEEP MEDICINE | Facility: HOSPITAL | Age: 54
End: 2024-04-04
Payer: COMMERCIAL

## 2024-04-04 DIAGNOSIS — R06.83 SNORING: ICD-10-CM

## 2024-04-04 PROCEDURE — 95806 SLEEP STUDY UNATT&RESP EFFT: CPT

## 2024-04-04 RX ORDER — TIRZEPATIDE 7.5 MG/.5ML
7.5 INJECTION, SOLUTION SUBCUTANEOUS
Qty: 4 PEN | Refills: 4 | Status: SHIPPED | OUTPATIENT
Start: 2024-04-04

## 2024-04-04 NOTE — TELEPHONE ENCOUNTER
prescription sent to     [facility] [address]  [facility] [address]  Phone: [phone] Fax: [phone]

## 2024-04-11 ENCOUNTER — TELEPHONE (OUTPATIENT)
Dept: FAMILY MEDICINE | Facility: CLINIC | Age: 54
End: 2024-04-11
Payer: COMMERCIAL

## 2024-04-11 DIAGNOSIS — G47.33 OSA (OBSTRUCTIVE SLEEP APNEA): Primary | ICD-10-CM

## 2024-04-11 NOTE — PROGRESS NOTES
Please call the patient regarding her abnormal result.  Her home sleep study shows severe sleep apnea. We will be ordering cpap titration study to see what settings she needs

## 2024-04-11 NOTE — TELEPHONE ENCOUNTER
----- Message from Tu Hills MD sent at 4/11/2024  2:02 PM CDT -----  Please call the patient regarding her abnormal result.  Her home sleep study shows severe sleep apnea. We will be ordering cpap titration study to see what settings she needs

## 2024-04-16 ENCOUNTER — PATIENT MESSAGE (OUTPATIENT)
Dept: FAMILY MEDICINE | Facility: CLINIC | Age: 54
End: 2024-04-16
Payer: COMMERCIAL

## 2024-04-16 DIAGNOSIS — E66.9 CLASS 1 OBESITY WITH SERIOUS COMORBIDITY AND BODY MASS INDEX (BMI) OF 33.0 TO 33.9 IN ADULT, UNSPECIFIED OBESITY TYPE: Primary | ICD-10-CM

## 2024-04-16 RX ORDER — NEBIVOLOL 10 MG/1
10 TABLET ORAL DAILY
Qty: 30 TABLET | Refills: 11 | Status: SHIPPED | OUTPATIENT
Start: 2024-04-16 | End: 2024-06-12 | Stop reason: SDUPTHER

## 2024-04-16 NOTE — TELEPHONE ENCOUNTER
prescription sent to     [id number] - [place] - [address] [facility] [address]  [address] [address]  Phone: [phone] Fax: [phone]

## 2024-04-17 RX ORDER — TIRZEPATIDE 5 MG/.5ML
5 INJECTION, SOLUTION SUBCUTANEOUS
Qty: 4 PEN | Refills: 3 | Status: SHIPPED | OUTPATIENT
Start: 2024-04-17

## 2024-04-17 NOTE — TELEPHONE ENCOUNTER
prescription sent to         [facility] [address]  [facility] [address]  Phone: [phone] Fax: [phone]

## 2024-04-19 ENCOUNTER — PATIENT MESSAGE (OUTPATIENT)
Dept: ADMINISTRATIVE | Facility: HOSPITAL | Age: 54
End: 2024-04-19
Payer: COMMERCIAL

## 2024-04-28 ENCOUNTER — PATIENT MESSAGE (OUTPATIENT)
Dept: FAMILY MEDICINE | Facility: CLINIC | Age: 54
End: 2024-04-28
Payer: COMMERCIAL

## 2024-04-28 DIAGNOSIS — N95.1 MENOPAUSAL SYMPTOMS: Primary | ICD-10-CM

## 2024-05-01 RX ORDER — FEZOLINETANT 45 MG/1
45 TABLET, FILM COATED ORAL DAILY
Qty: 30 TABLET | Refills: 2 | Status: SHIPPED | OUTPATIENT
Start: 2024-05-01

## 2024-05-01 NOTE — TELEPHONE ENCOUNTER
prescription sent to       [id number] - [place] - [address] AT [facility] &   [address]  [place] [address]  Phone: [phone] Fax: [phone]    [id number] - [place] - [address]  [address]  [place] [address]  Phone: [phone] Fax: [phone]    [facility]  [address]  [place] [address]  Phone: [phone] Fax: [phone]

## 2024-05-02 ENCOUNTER — PATIENT MESSAGE (OUTPATIENT)
Dept: ADMINISTRATIVE | Facility: OTHER | Age: 54
End: 2024-05-02
Payer: COMMERCIAL

## 2024-05-06 ENCOUNTER — PATIENT MESSAGE (OUTPATIENT)
Dept: FAMILY MEDICINE | Facility: CLINIC | Age: 54
End: 2024-05-06
Payer: COMMERCIAL

## 2024-05-16 DIAGNOSIS — G47.33 OSA (OBSTRUCTIVE SLEEP APNEA): Primary | ICD-10-CM

## 2024-05-16 NOTE — PROGRESS NOTES
Patient with severe sleep apnea with an index of 80 per home sleep study performed on 04/04/2024.   auto CPAP range 4-18 cm H2O has been ordered.

## 2024-05-23 ENCOUNTER — PATIENT MESSAGE (OUTPATIENT)
Dept: FAMILY MEDICINE | Facility: CLINIC | Age: 54
End: 2024-05-23
Payer: COMMERCIAL

## 2024-06-11 ENCOUNTER — PATIENT MESSAGE (OUTPATIENT)
Dept: FAMILY MEDICINE | Facility: CLINIC | Age: 54
End: 2024-06-11
Payer: COMMERCIAL

## 2024-06-11 DIAGNOSIS — F17.218 CIGARETTE NICOTINE DEPENDENCE WITH OTHER NICOTINE-INDUCED DISORDER: ICD-10-CM

## 2024-06-11 DIAGNOSIS — F17.200 CURRENT SMOKER: Primary | ICD-10-CM

## 2024-06-19 RX ORDER — VARENICLINE TARTRATE 1 MG/1
1 TABLET, FILM COATED ORAL 2 TIMES DAILY
Qty: 180 TABLET | Refills: 1 | Status: SHIPPED | OUTPATIENT
Start: 2024-06-19

## 2024-06-24 NOTE — TELEPHONE ENCOUNTER
The patient's prescription has been approved and sent to [facility]  [address]  [facility] [address]  Phone: [phone] Fax: [phone]

## 2024-07-03 ENCOUNTER — HOSPITAL ENCOUNTER (OUTPATIENT)
Dept: RADIOLOGY | Facility: HOSPITAL | Age: 54
Discharge: HOME | End: 2024-07-03
Attending: FAMILY MEDICINE
Payer: COMMERCIAL

## 2024-07-03 VITALS — BODY MASS INDEX: 30.92 KG/M2 | HEIGHT: 67 IN | WEIGHT: 197 LBS

## 2024-07-03 DIAGNOSIS — Z12.31 ENCOUNTER FOR SCREENING MAMMOGRAM FOR BREAST CANCER: ICD-10-CM

## 2024-07-03 DIAGNOSIS — Z12.31 ENCOUNTER FOR SCREENING MAMMOGRAM FOR BREAST CANCER: Primary | ICD-10-CM

## 2024-07-03 PROCEDURE — 77067 SCR MAMMO BI INCL CAD: CPT | Mod: TC,PO

## 2024-07-03 PROCEDURE — 77063 BREAST TOMOSYNTHESIS BI: CPT | Mod: 26,,, | Performed by: RADIOLOGY

## 2024-07-03 PROCEDURE — 77067 SCR MAMMO BI INCL CAD: CPT | Mod: 26,,, | Performed by: RADIOLOGY

## 2024-07-05 ENCOUNTER — TELEPHONE (OUTPATIENT)
Dept: RADIOLOGY | Facility: HOSPITAL | Age: 54
End: 2024-07-05

## 2024-07-15 ENCOUNTER — HOSPITAL ENCOUNTER (OUTPATIENT)
Dept: RADIOLOGY | Facility: HOSPITAL | Age: 54
Discharge: HOME | End: 2024-07-15
Attending: ORTHOPAEDIC SURGERY
Payer: COMMERCIAL

## 2024-07-15 ENCOUNTER — OFFICE VISIT (OUTPATIENT)
Dept: ORTHOPEDICS | Facility: CLINIC | Age: 54
End: 2024-07-15
Payer: COMMERCIAL

## 2024-07-15 VITALS — HEIGHT: 67 IN | BODY MASS INDEX: 30.93 KG/M2 | WEIGHT: 197.06 LBS | RESPIRATION RATE: 18 BRPM

## 2024-07-15 DIAGNOSIS — M79.644 FINGER PAIN, RIGHT: ICD-10-CM

## 2024-07-15 DIAGNOSIS — M65.331 TRIGGER FINGER, RIGHT MIDDLE FINGER: Primary | ICD-10-CM

## 2024-07-15 DIAGNOSIS — M79.644 FINGER PAIN, RIGHT: Primary | ICD-10-CM

## 2024-07-15 PROCEDURE — 99204 OFFICE O/P NEW MOD 45 MIN: CPT | Mod: 25,S$GLB,, | Performed by: ORTHOPAEDIC SURGERY

## 2024-07-15 PROCEDURE — 20550 NJX 1 TENDON SHEATH/LIGAMENT: CPT | Mod: RT,S$GLB,, | Performed by: ORTHOPAEDIC SURGERY

## 2024-07-15 PROCEDURE — 4010F ACE/ARB THERAPY RXD/TAKEN: CPT | Mod: CPTII,S$GLB,, | Performed by: ORTHOPAEDIC SURGERY

## 2024-07-15 PROCEDURE — 1160F RVW MEDS BY RX/DR IN RCRD: CPT | Mod: CPTII,S$GLB,, | Performed by: ORTHOPAEDIC SURGERY

## 2024-07-15 PROCEDURE — 1159F MED LIST DOCD IN RCRD: CPT | Mod: CPTII,S$GLB,, | Performed by: ORTHOPAEDIC SURGERY

## 2024-07-15 PROCEDURE — 73130 X-RAY EXAM OF HAND: CPT | Mod: TC,RT

## 2024-07-15 PROCEDURE — 73130 X-RAY EXAM OF HAND: CPT | Mod: 26,RT,, | Performed by: RADIOLOGY

## 2024-07-15 PROCEDURE — 3008F BODY MASS INDEX DOCD: CPT | Mod: CPTII,S$GLB,, | Performed by: ORTHOPAEDIC SURGERY

## 2024-07-15 PROCEDURE — 99999 PR PBB SHADOW E&M-EST. PATIENT-LVL III: CPT | Mod: PBBFAC,,, | Performed by: ORTHOPAEDIC SURGERY

## 2024-07-15 PROCEDURE — 3044F HG A1C LEVEL LT 7.0%: CPT | Mod: CPTII,S$GLB,, | Performed by: ORTHOPAEDIC SURGERY

## 2024-07-15 RX ORDER — TRIAMCINOLONE ACETONIDE 40 MG/ML
40 INJECTION, SUSPENSION INTRA-ARTICULAR; INTRAMUSCULAR
Status: DISCONTINUED | OUTPATIENT
Start: 2024-07-15 | End: 2024-07-15 | Stop reason: HOSPADM

## 2024-07-15 RX ADMIN — TRIAMCINOLONE ACETONIDE 40 MG: 40 INJECTION, SUSPENSION INTRA-ARTICULAR; INTRAMUSCULAR at 08:07

## 2024-07-15 NOTE — PROGRESS NOTES
Past Medical History:   Diagnosis Date    Essential hypertension 11/21/2022       Past Surgical History:   Procedure Laterality Date    HIP ARTHROPLASTY Left 12/20/2023    Procedure: ARTHROPLASTY, HIP;  Surgeon: Carlos Alberto Bejarano II, MD;  Location: [facility];  Service: Orthopedics;  Laterality: Left;       Current Outpatient Medications   Medication Sig    calcium-vitamin D3 (OS-ALDO 500 + D3) 500 mg-5 mcg (200 unit) per tablet Take 1 tablet by mouth 2 (two) times daily with meals.    cloNIDine (CATAPRES) 0.1 MG tablet Take 1 tablet (0.1 mg total) by mouth every evening.    fezolinetant (VEOZAH) 45 mg Tab Take 45 mg by mouth Daily.    furosemide (LASIX) 20 MG tablet Take 1 tablet (20 mg total) by mouth once daily.    nebivoloL (BYSTOLIC) 10 MG Tab Take 1 tablet (10 mg total) by mouth once daily.    prasterone, DHEA, (DHEA ORAL) Take by mouth.    tirzepatide, weight loss, (ZEPBOUND) 5 mg/0.5 mL PnIj Inject 5 mg into the skin every 7 days.    tirzepatide, weight loss, (ZEPBOUND) 7.5 mg/0.5 mL PnIj Inject 7.5 mg into the skin every 7 days.    varenicline (CHANTIX) 1 mg Tab Take 1 tablet (1 mg total) by mouth 2 (two) times daily.    venlafaxine (EFFEXOR-XR) 75 MG 24 hr capsule Take 1 capsule (75 mg total) by mouth once daily.     No current facility-administered medications for this visit.       Review of patient's allergies indicates:  No Known Allergies    Family History   Problem Relation Name Age of Onset    Hypertension Mother      Heart disease Mother  74        CHF    Osteoporosis Mother      Cancer Father  73        colon & prostate    Diabetes Father      Hypertension Father         Social History     Socioeconomic History    Marital status:    Tobacco Use    Smoking status: Every Day     Current packs/day: 1.00     Average packs/day: 1 pack/day for 37.5 years (37.5 ttl pk-yrs)     Types: Cigarettes     Start date: 1983     Last attempt to quit: 1/1/2013    Smokeless tobacco: Never   Substance and Sexual  Activity    Alcohol use: Yes     Comment: periodically at dinner    Drug use: Never   Social History Narrative    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[employer], , no children, quit smoking 2013, ETOH socially, GYN here, nml mammogram 2015, nml pap 2013 - all previous normal, HPV negative 2015, never had colonoscopy     Social Determinants of Health     Financial Resource Strain: Low Risk  (3/14/2024)    Overall Financial Resource Strain (CARDIA)     Difficulty of Paying Living Expenses: Not very hard   Food Insecurity: Unknown (3/14/2024)    Hunger Vital Sign     Worried About Running Out of Food in the Last Year: Patient declined     Ran Out of Food in the Last Year: Never true   Transportation Needs: No Transportation Needs (3/14/2024)    PRAPARE - Transportation     Lack of Transportation (Medical): No     Lack of Transportation (Non-Medical): No   Physical Activity: Unknown (3/14/2024)    Exercise Vital Sign     Days of Exercise per Week: 0 days   Stress: Stress Concern Present (3/14/2024)    [employer] - Occupational Stress Questionnaire     Feeling of Stress : To some extent   Housing Stability: High Risk (3/14/2024)    Housing Stability Vital Sign     Unable to Pay for Housing in the Last Year: Yes     Number of Places Lived in the Last Year: 1     Unstable Housing in the Last Year: No       Chief Complaint:   Chief Complaint   Patient presents with    Right Hand - Pain     Right long trigger finger        History of present illness:  54-year-old female seen for a right long trigger finger.  Patient is right-hand dominant.  This has been ongoing for about 3 months.  Patient is using a little wrap.  No history of an another fingers.  No previous treatments.  Pain is a 3/10.        Review of Systems:    Constitution: Negative for chills, fever, and sweats.  Negative for unexplained weight loss.    HENT:  Negative for headaches and blurry vision.    Cardiovascular:Negative for chest  pain or irregular heart beat. Negative for hypertension.    Respiratory:  Negative for cough and shortness of breath.    Gastrointestinal: Negative for abdominal pain, heartburn, melena, nausea, and vomitting.    Genitourinary:  Negative bladder incontinence and dysuria.    Musculoskeletal:  See HPI    Neurological: Negative for numbness.    Psychiatric/Behavioral: Negative for depression.  The patient is not nervous/anxious.      Endocrine: Negative for polyuria    Hematologic/Lymphatic: Negative for bleeding problem.  Does not bruise/bleed easily.    Skin: Negative for poor would healing and rash      Physical Examination:    Vital Signs:    Vitals:    07/15/24 0839   Resp: 18       Body mass index is 30.87 kg/m².    This a well-developed, well nourished patient in no acute distress.  They are alert and oriented and cooperative to examination.  Pt. walks without an antalgic gait.      Examination of the right hand and wrist shows no signs of rashes or erythema. Patient has no masses ecchymosis or effusions. Patient has full range of motion of the wrist in flexion and extension as well as ulnar and radial deviation. The patient also has full range of motion of all joints in the hand. There are 2+ radial pulse and intact light touch sensation in all 5 digits. Nontender over the anatomic snuffbox.  Positive pain and locking at the A1 pulley of the middle finger      X-rays:  X-rays of the right hand are ordered and reviewed which show no bony pathology         Assessment:  Right middle trigger finger    Plan:  I reviewed the findings with her today.  Talked about trigger fingers.  We talked about treatment options.  Patient elected for trigger finger injection.  Follow up as needed.            All previous pertinent notes including ER visits, physical therapy visits, other orthopedic visits as well as other care for the same musculoskeletal problem were reviewed.  All pertinent lab values and previous imaging was  reviewed pertinent to the current visit.    This note was created using [employer] voice recognition software that occasionally misinterpreted phrases or words.    Consult note is delivered via Epic messaging service.

## 2024-07-15 NOTE — PATIENT INSTRUCTIONS
Trigger Finger     Trigger finger limits finger movement. When you try to straighten your finger, it will lock or catch before popping out straight.  Trigger finger is a condition that affects the tendons in your fingers or thumb.    In this photo, the patient's finger sticks in the bent position as she tries to straighten it.   Anatomy     The tendon sheath attaches to the finger bones and keeps the flexor tendon in place as it moves.   Tendons are tissues that connect muscles to bone. When muscles contract, tendons pull on bones. This is what causes some parts of the body to move.  The muscles that move the fingers and thumb are located in the forearm, above the wrist. Long tendons -- called the flexor tendons -- extend from the muscles through the wrist and attach to the small bones of the fingers and thumb.  These flexor tendons control the movements of the fingers and thumb. When you bend or straighten your finger, the flexor tendon slides through a snug tunnel, called the tendon sheath, that keeps the tendon in place next to the bones.    Description   The flexor tendon can become irritated as it slides through the tendon sheath tunnel. As it becomes more and more irritated, the tendon may thicken and nodules may form, making its passage through the tunnel more difficult.  The tendon sheath may also thicken, causing the opening of the tunnel to become smaller.  If you have trigger finger, the tendon becomes momentarily stuck at the mouth of the tendon sheath tunnel when you try to straighten your finger. You might feel a pop as the tendon slips through the tight area and your finger will suddenly shoot straight out.    The thickened nodule on the flexor tendon strikes the sheath tunnel, making it difficult to straighten the finger.     Cause   The cause of trigger finger is usually unknown. There are factors that put people at greater risk for developing it.  Trigger fingers are more common in women than men.    They occur most frequently in people who are between the ages of 40 and 60 years of age.   Trigger fingers are more common in people with certain medical problems, such as diabetes and rheumatoid arthritis.   Trigger fingers may occur after activities that strain the hand.     Symptoms   Symptoms of trigger finger usually start without any injury, although they may follow a period of heavy hand use. Symptoms may include:  A tender lump in your palm   Swelling   Catching or popping sensation in your finger or thumb joints   Pain when bending or straightening your finger   Stiffness and catching tend to be worse after inactivity, such as when you wake in the morning. Your fingers will often loosen up as you move them.  Sometimes, when the tendon breaks free, it may feel like your finger joint is dislocating. In severe cases of trigger finger, the finger cannot be straightened, even with help. Sometimes, one or more fingers are affected.    Doctor Examination   Your doctor can diagnose the problem by talking with you and examining your hand. No other testing or x-rays are usually needed to diagnose trigger finger.    Nonsurgical Treatment   Rest  If symptoms are mild, resting the finger may be enough to resolve the problem. Your doctor may recommend a splint to keep your finger in a neutral, resting position.  Medications  Over-the-counter pain medications, such as non-steroidal anti-inflammatory medicines (NSAIDS) or acetaminophen can be used to relieve the pain.  Steroid Injections  Your doctor may choose to inject a corticosteroid -- a powerful anti-inflammatory medication -- into the tendon sheath. In some cases, this improves the problem only temporarily,and another injection is needed. If two injections fail to resolve the problem, surgery should be considered.  Injections are less likely to provide permanent relief if you have had the triggering for a long time, or if you have an associated medical problem,  like diabetes.    Surgical Treatment   Trigger finger is not a dangerous condition. The decision to have surgery is a personal one, based on how severe your symptoms are and whether nonsurgical options have failed. In addition, if your finger is stuck in a bent position, your doctor may recommend surgery to prevent permanent stiffness.  Surgical Procedure  The goal of surgery is to widen the opening of the tunnel so that the tendon can slide through it more easily. This is usually done on an outpatient basis, meaning you will not need to stay overnight at the hospital.  Most people are given an injection of local anesthesia to numb the hand for the procedure.  The surgery is performed through a small incision in the palm or sometimes with the tip of a needle. The tendon sheath tunnel is cut. When it heals back together, the sheath is looser and the tendon has more room to move through it.    During surgery, the tendon sheath is cut.     Complications  Incomplete extension -- due to persistent tightness of the tendon sheath beyond the part that was released   Persistent triggering -- due to incomplete release of the first part of the sheath   Bowstringing -- due to excessive release of the sheath   Infection   Recovery  Most people are able to move their fingers immediately after surgery.  It is common to have some soreness in your palm. Frequently raising your hand above your heart can help reduce swelling and pain.  Recovery is usually complete within a few weeks, but it may take up to 6 months for all swelling and stiffness to go away.  If your finger was quite stiff before surgery, physical therapy and finger exercises may help loosen it up.

## 2024-07-15 NOTE — PROCEDURES
Tendon Sheath    Date/Time: 7/15/2024 8:15 AM    Performed by: Boo Pate MD  Authorized by: Boo Pate MD    Consent Done?:  Yes (Verbal)  Indications:  Pain  Site marked: the procedure site was marked    Timeout: prior to procedure the correct patient, procedure, and site was verified    Prep: patient was prepped and draped in usual sterile fashion      Local anesthesia used?: Yes    Anesthesia:  Local infiltration  Local anesthetic:  Lidocaine 1% without epinephrine and bupivacaine 0.25% without epinephrine  Anesthetic total (ml):  2    Location:  Long finger  Site:  R long flexor tendon sheath  Ultrasonic guidance for needle placement?: No    Needle size:  22 G  Approach:  Volar  Medications:  40 mg triamcinolone acetonide 40 mg/mL  Patient tolerance:  Patient tolerated the procedure well with no immediate complications

## 2024-07-22 ENCOUNTER — HOSPITAL ENCOUNTER (OUTPATIENT)
Dept: RADIOLOGY | Facility: HOSPITAL | Age: 54
Discharge: HOME | End: 2024-07-22
Attending: FAMILY MEDICINE
Payer: COMMERCIAL

## 2024-07-22 VITALS — WEIGHT: 197 LBS | BODY MASS INDEX: 30.92 KG/M2 | HEIGHT: 67 IN

## 2024-07-22 DIAGNOSIS — R92.8 ABNORMAL MAMMOGRAM: ICD-10-CM

## 2024-07-22 PROCEDURE — 77061 BREAST TOMOSYNTHESIS UNI: CPT | Mod: 26,LT,, | Performed by: RADIOLOGY

## 2024-07-22 PROCEDURE — 77065 DX MAMMO INCL CAD UNI: CPT | Mod: 26,LT,, | Performed by: RADIOLOGY

## 2024-07-22 PROCEDURE — 76642 ULTRASOUND BREAST LIMITED: CPT | Mod: TC,PO,LT

## 2024-07-22 PROCEDURE — 77061 BREAST TOMOSYNTHESIS UNI: CPT | Mod: TC,PO,LT

## 2024-07-22 PROCEDURE — 76642 ULTRASOUND BREAST LIMITED: CPT | Mod: 26,LT,, | Performed by: RADIOLOGY

## 2024-07-31 ENCOUNTER — OFFICE VISIT (OUTPATIENT)
Dept: FAMILY MEDICINE | Facility: CLINIC | Age: 54
End: 2024-07-31
Payer: COMMERCIAL

## 2024-07-31 VITALS
WEIGHT: 201 LBS | HEIGHT: 67 IN | HEART RATE: 84 BPM | DIASTOLIC BLOOD PRESSURE: 88 MMHG | SYSTOLIC BLOOD PRESSURE: 122 MMHG | BODY MASS INDEX: 31.55 KG/M2

## 2024-07-31 DIAGNOSIS — G47.33 OSA ON CPAP: ICD-10-CM

## 2024-07-31 DIAGNOSIS — F17.219 CIGARETTE NICOTINE DEPENDENCE WITH NICOTINE-INDUCED DISORDER: ICD-10-CM

## 2024-07-31 DIAGNOSIS — N95.1 PERIMENOPAUSAL SYMPTOM: ICD-10-CM

## 2024-07-31 DIAGNOSIS — Z87.891 PERSONAL HISTORY OF NICOTINE DEPENDENCE: ICD-10-CM

## 2024-07-31 DIAGNOSIS — I10 ESSENTIAL HYPERTENSION: Primary | ICD-10-CM

## 2024-07-31 DIAGNOSIS — F43.21 SITUATIONAL DEPRESSION: ICD-10-CM

## 2024-07-31 RX ORDER — VENLAFAXINE HYDROCHLORIDE 75 MG/1
75 CAPSULE, EXTENDED RELEASE ORAL DAILY
Qty: 90 CAPSULE | Refills: 3 | Status: SHIPPED | OUTPATIENT
Start: 2024-07-31

## 2024-07-31 RX ORDER — FEZOLINETANT 45 MG/1
45 TABLET, FILM COATED ORAL DAILY
Qty: 30 TABLET | Refills: 11 | Status: SHIPPED | OUTPATIENT
Start: 2024-07-31

## 2024-07-31 NOTE — PROGRESS NOTES
SUBJECTIVE:    Patient ID: Nohelia Turk is a 54 y.o. female.    Chief Complaint: 4M HTN Check Up (-HIV / Lung Screening Due/-Tdap / Shingles / Covid Vaccinations Due/-Pneumonia Vaccination Due/Available)    Patient with hypertension in for visit.  Blood pressures have done much better with change in her medication.  She is also feeling better and getting better rest after her diagnosis of obstructive sleep apnea.  She currently uses a CPAP with benefit.    Patient has been a smoker since the age of 15 and is working on cessation.  Had managed to quit for 2 months with the use of Chantix but discontinued the medicine.  She is ready to start smoking cessation with the 1st of the month and would like to resume her medication.  We discussed some of her pitfalls 2 remaining quit. .  Lung CT screening discussed secondary to her high-risk factors with long history of smoking.  Patient has an approximately 30 pack-year history.  She is agreeable to testing.      History of obesity and has been on Zetia bound.  Tolerating medication well.  No change on the scale currently but has a significant loss of inches.  Regular clothes and her jewelry no longer fit.    Mood has been appropriate since the death of her .  Remains on venlafaxine.      Perimenopausal started on Veozah for symptoms with benefit.  Did report a 1 day episode of spotting.  It has been less than a year since her last menstrual cycle.  Well-woman examination is due.    Hypertension  This is a recurrent problem. The current episode started more than 1 year ago. The problem has been gradually improving since onset. The problem is controlled. Pertinent negatives include no anxiety, blurred vision, chest pain, headaches, malaise/fatigue, neck pain, orthopnea, palpitations, peripheral edema, PND, shortness of breath or sweats. Agents associated with hypertension include NSAIDs. Risk factors for coronary artery disease include family history, obesity,  sedentary lifestyle, smoking/tobacco exposure and stress. Past treatments include calcium channel blockers, diuretics and lifestyle changes. The current treatment provides significant improvement. Compliance problems include exercise.        Past Medical History:   Diagnosis Date    Essential hypertension 11/21/2022     Past Surgical History:   Procedure Laterality Date    HIP ARTHROPLASTY Left 12/20/2023    Procedure: ARTHROPLASTY, HIP;  Surgeon: Carlos Alberto Bejarano II, MD;  [facility];  Service: Orthopedics;  Laterality: Left;     Family History   Problem Relation Name Age of Onset    Hypertension Mother      Heart disease Mother  74        CHF    Osteoporosis Mother      Cancer Father  73        colon & prostate    Diabetes Father      Hypertension Father         Marital Status:   Alcohol History:  reports current alcohol use.  Tobacco History:  reports that she has been smoking cigarettes. She started smoking about 41 years ago. She has a 37.6 pack-year smoking history. She has never used smokeless tobacco.  Drug History:  reports no history of drug use.    Review of patient's allergies indicates:  No Known Allergies    Current Outpatient Medications:     calcium-vitamin D3 (OS-ALDO 500 + D3) 500 mg-5 mcg (200 unit) per tablet, Take 1 tablet by mouth 2 (two) times daily with meals., Disp: , Rfl:     cloNIDine (CATAPRES) 0.1 MG tablet, Take 1 tablet (0.1 mg total) by mouth every evening., Disp: 90 tablet, Rfl: 3    nebivoloL (BYSTOLIC) 10 MG Tab, Take 1 tablet (10 mg total) by mouth once daily., Disp: 90 tablet, Rfl: 3    prasterone, DHEA, (DHEA ORAL), Take by mouth., Disp: , Rfl:     tirzepatide, weight loss, (ZEPBOUND) 7.5 mg/0.5 mL PnIj, Inject 7.5 mg into the skin every 7 days., Disp: 4 Pen, Rfl: 4    varenicline (CHANTIX) 1 mg Tab, Take 1 tablet (1 mg total) by mouth 2 (two) times daily., Disp: 180 tablet, Rfl: 1    fezolinetant (VEOZAH) 45 mg Tab, Take 45 mg by mouth Daily., Disp: 30 tablet, Rfl:  "11    venlafaxine (EFFEXOR-XR) 75 MG 24 hr capsule, Take 1 capsule (75 mg total) by mouth once daily., Disp: 90 capsule, Rfl: 3    Review of Systems   Constitutional:  Negative for activity change, fatigue, malaise/fatigue and unexpected weight change.   HENT:  Negative for hearing loss, postnasal drip, sinus pressure, sore throat and voice change.    Eyes:  Negative for blurred vision, photophobia and visual disturbance.   Respiratory:  Negative for cough, shortness of breath and wheezing.    Cardiovascular:  Negative for chest pain, palpitations, orthopnea and PND.   Gastrointestinal:  Negative for constipation, diarrhea and nausea.   Genitourinary:  Negative for difficulty urinating, frequency, hematuria and urgency.   Musculoskeletal:  Negative for arthralgias, back pain and neck pain.   Skin:  Negative for rash.   Neurological:  Negative for weakness, light-headedness and headaches.   Hematological:  Negative for adenopathy. Does not bruise/bleed easily.   Psychiatric/Behavioral:  The patient is not nervous/anxious.           Objective:          7/22/2024     9:09 AM 7/31/2024     8:55 AM   Vitals - 1 value per visit   SYSTOLIC  122   DIASTOLIC  88   Pulse  84   Weight (lb) 197 201   Weight (kg) 89.359 91.173   Height 5' 7" (1.702 m) 5' 7" (1.702 m)   BMI (Calculated) 30.8 31.5      Physical Exam  Constitutional:       Appearance: Normal appearance.   HENT:      Head: Normocephalic and atraumatic.      Mouth/Throat:      Mouth: Mucous membranes are moist.   Eyes:      Conjunctiva/sclera: Conjunctivae normal.   Pulmonary:      Effort: Pulmonary effort is normal.   Neurological:      General: No focal deficit present.      Mental Status: She is alert and oriented to person, place, and time.   Psychiatric:         Mood and Affect: Mood normal.         Behavior: Behavior normal.           Assessment:       1. Essential hypertension    2. Perimenopausal symptom    3. Situational depression    4. Cigarette nicotine " dependence with nicotine-induced disorder    5. Personal history of nicotine dependence    6. RADHA on CPAP         Plan:       Essential hypertension  Comments:  well controlled    Perimenopausal symptom  -     fezolinetant (VEOZAH) 45 mg Tab; Take 45 mg by mouth Daily.  Dispense: 30 tablet; Refill: 11    Situational depression  -     venlafaxine (EFFEXOR-XR) 75 MG 24 hr capsule; Take 1 capsule (75 mg total) by mouth once daily.  Dispense: 90 capsule; Refill: 3    Cigarette nicotine dependence with nicotine-induced disorder  Comments:  cessation discussed. Quit date 8/1/2024. start chantix    Personal history of nicotine dependence  -     CT Chest Lung Screening Low Dose; Future; Expected date: 07/31/2024    RADHA on CPAP  Comments:  using nightly with benefit      Medication List with Changes/Refills   Current Medications    CALCIUM-VITAMIN D3 (OS-ALDO 500 + D3) 500 MG-5 MCG (200 UNIT) PER TABLET    Take 1 tablet by mouth 2 (two) times daily with meals.    CLONIDINE (CATAPRES) 0.1 MG TABLET    Take 1 tablet (0.1 mg total) by mouth every evening.    NEBIVOLOL (BYSTOLIC) 10 MG TAB    Take 1 tablet (10 mg total) by mouth once daily.    PRASTERONE, DHEA, (DHEA ORAL)    Take by mouth.    TIRZEPATIDE, WEIGHT LOSS, (ZEPBOUND) 7.5 MG/0.5 ML PNIJ    Inject 7.5 mg into the skin every 7 days.    VARENICLINE (CHANTIX) 1 MG TAB    Take 1 tablet (1 mg total) by mouth 2 (two) times daily.   Changed and/or Refilled Medications    Modified Medication Previous Medication    FEZOLINETANT (VEOZAH) 45 MG TAB fezolinetant (VEOZAH) 45 mg Tab       Take 45 mg by mouth Daily.    Take 45 mg by mouth Daily.    VENLAFAXINE (EFFEXOR-XR) 75 MG 24 HR CAPSULE venlafaxine (EFFEXOR-XR) 75 MG 24 hr capsule       Take 1 capsule (75 mg total) by mouth once daily.    Take 1 capsule (75 mg total) by mouth once daily.   Discontinued Medications    FUROSEMIDE (LASIX) 20 MG TABLET    Take 1 tablet (20 mg total) by mouth once daily.    TIRZEPATIDE, WEIGHT LOSS,  (ZEPBOUND) 5 MG/0.5 ML PNIJ    Inject 5 mg into the skin every 7 days.      Follow up in about 6 months (around 1/31/2025) for Well Woman w/Pap.

## 2024-08-03 ENCOUNTER — HOSPITAL ENCOUNTER (OUTPATIENT)
Dept: RADIOLOGY | Facility: HOSPITAL | Age: 54
Discharge: HOME | End: 2024-08-03
Attending: FAMILY MEDICINE
Payer: COMMERCIAL

## 2024-08-03 DIAGNOSIS — Z87.891 PERSONAL HISTORY OF NICOTINE DEPENDENCE: ICD-10-CM

## 2024-08-03 PROCEDURE — 71271 CT THORAX LUNG CANCER SCR C-: CPT | Mod: TC

## 2024-08-03 PROCEDURE — 71271 CT THORAX LUNG CANCER SCR C-: CPT | Mod: 26,,, | Performed by: RADIOLOGY

## 2024-08-13 DIAGNOSIS — N95.1 PERIMENOPAUSAL SYMPTOM: ICD-10-CM

## 2024-08-13 RX ORDER — FEZOLINETANT 45 MG/1
45 TABLET, FILM COATED ORAL DAILY
Qty: 30 TABLET | Refills: 11 | Status: SHIPPED | OUTPATIENT
Start: 2024-08-13

## 2024-08-13 NOTE — TELEPHONE ENCOUNTER
Please see the attached refill request.  Next Appt this Specialty: With Family Medicine (Tu Hills MD)  02/06/2025 at 8:20 AM

## 2024-08-13 NOTE — TELEPHONE ENCOUNTER
prescription sent to   [id number] - [place] - [address] [facility] [address]  [address] [address]  Phone: [phone] Fax: [phone]

## 2024-08-18 ENCOUNTER — PATIENT MESSAGE (OUTPATIENT)
Dept: FAMILY MEDICINE | Facility: CLINIC | Age: 54
End: 2024-08-18
Payer: COMMERCIAL

## 2024-08-18 DIAGNOSIS — G47.33 OSA ON CPAP: Primary | ICD-10-CM

## 2024-08-19 ENCOUNTER — TELEPHONE (OUTPATIENT)
Dept: FAMILY MEDICINE | Facility: CLINIC | Age: 54
End: 2024-08-19
Payer: COMMERCIAL

## 2024-08-19 ENCOUNTER — PATIENT MESSAGE (OUTPATIENT)
Dept: FAMILY MEDICINE | Facility: CLINIC | Age: 54
End: 2024-08-19
Payer: COMMERCIAL

## 2024-08-19 DIAGNOSIS — I10 ESSENTIAL HYPERTENSION: Primary | ICD-10-CM

## 2024-08-19 DIAGNOSIS — E66.9 CLASS 1 OBESITY WITH SERIOUS COMORBIDITY AND BODY MASS INDEX (BMI) OF 33.0 TO 33.9 IN ADULT, UNSPECIFIED OBESITY TYPE: ICD-10-CM

## 2024-08-19 RX ORDER — TIRZEPATIDE 7.5 MG/.5ML
7.5 INJECTION, SOLUTION SUBCUTANEOUS
Qty: 4 PEN | Refills: 4 | OUTPATIENT
Start: 2024-08-19

## 2024-08-19 NOTE — TELEPHONE ENCOUNTER
----- Message from Lady Gamez MA sent at 8/16/2024  1:30 PM CDT -----  Type: Patient Call Back    [facility]    What is the request in detail: following up on a request for c-pap supplies,,     Can the clinic reply by [facility]?NO    Would the patient rather a call back or a response via [facility]? Yes, call     Best call back number: [phone] Fax: [phone] Ref# [id number]

## 2024-08-20 RX ORDER — TIRZEPATIDE 10 MG/.5ML
10 INJECTION, SOLUTION SUBCUTANEOUS
Qty: 2 ML | Refills: 11 | Status: SHIPPED | OUTPATIENT
Start: 2024-08-20

## 2024-09-08 DIAGNOSIS — N95.1 MENOPAUSAL SYMPTOMS: ICD-10-CM

## 2024-09-10 RX ORDER — CLONIDINE HYDROCHLORIDE 0.1 MG/1
0.1 TABLET ORAL NIGHTLY
Qty: 90 TABLET | Refills: 3 | Status: SHIPPED | OUTPATIENT
Start: 2024-09-10 | End: 2025-09-10

## 2024-11-13 ENCOUNTER — PATIENT MESSAGE (OUTPATIENT)
Dept: FAMILY MEDICINE | Facility: CLINIC | Age: 54
End: 2024-11-13
Payer: COMMERCIAL

## 2024-11-13 DIAGNOSIS — I10 ESSENTIAL HYPERTENSION: ICD-10-CM

## 2024-11-13 DIAGNOSIS — E66.811 CLASS 1 OBESITY WITH SERIOUS COMORBIDITY AND BODY MASS INDEX (BMI) OF 33.0 TO 33.9 IN ADULT, UNSPECIFIED OBESITY TYPE: ICD-10-CM

## 2024-11-13 RX ORDER — TIRZEPATIDE 10 MG/.5ML
10 INJECTION, SOLUTION SUBCUTANEOUS
Qty: 6 ML | Refills: 2 | Status: SHIPPED | OUTPATIENT
Start: 2024-11-13

## 2024-11-13 NOTE — TELEPHONE ENCOUNTER
prescription sent to       [facility] - [place], [place] - [address] [place] [id number]  Phone: [phone] Fax: [phone]

## 2024-11-19 ENCOUNTER — PATIENT MESSAGE (OUTPATIENT)
Dept: FAMILY MEDICINE | Facility: CLINIC | Age: 54
End: 2024-11-19
Payer: COMMERCIAL

## 2025-01-18 DIAGNOSIS — N95.1 MENOPAUSAL SYMPTOMS: ICD-10-CM

## 2025-01-21 RX ORDER — CLONIDINE HYDROCHLORIDE 0.1 MG/1
0.1 TABLET ORAL NIGHTLY
Qty: 90 TABLET | Refills: 3 | Status: SHIPPED | OUTPATIENT
Start: 2025-01-21

## 2025-01-23 DIAGNOSIS — N95.1 PERIMENOPAUSAL SYMPTOM: ICD-10-CM

## 2025-01-23 RX ORDER — FEZOLINETANT 45 MG/1
45 TABLET, FILM COATED ORAL DAILY
Qty: 90 TABLET | Refills: 3 | Status: SHIPPED | OUTPATIENT
Start: 2025-01-23

## 2025-02-06 ENCOUNTER — OFFICE VISIT (OUTPATIENT)
Dept: FAMILY MEDICINE | Facility: CLINIC | Age: 55
End: 2025-02-06
Payer: COMMERCIAL

## 2025-02-06 VITALS
SYSTOLIC BLOOD PRESSURE: 152 MMHG | OXYGEN SATURATION: 99 % | BODY MASS INDEX: 27.47 KG/M2 | WEIGHT: 175 LBS | DIASTOLIC BLOOD PRESSURE: 104 MMHG | HEART RATE: 70 BPM | HEIGHT: 67 IN

## 2025-02-06 DIAGNOSIS — F17.218 CIGARETTE NICOTINE DEPENDENCE WITH OTHER NICOTINE-INDUCED DISORDER: ICD-10-CM

## 2025-02-06 DIAGNOSIS — G47.33 OSA ON CPAP: ICD-10-CM

## 2025-02-06 DIAGNOSIS — Z01.419 WELL WOMAN EXAM WITH ROUTINE GYNECOLOGICAL EXAM: Primary | ICD-10-CM

## 2025-02-06 DIAGNOSIS — R73.03 PREDIABETES: ICD-10-CM

## 2025-02-06 DIAGNOSIS — N95.1 PERIMENOPAUSAL SYMPTOM: ICD-10-CM

## 2025-02-06 DIAGNOSIS — F43.21 SITUATIONAL DEPRESSION: ICD-10-CM

## 2025-02-06 DIAGNOSIS — E66.01 SEVERE OBESITY (BMI 35.0-39.9) WITH COMORBIDITY: ICD-10-CM

## 2025-02-06 DIAGNOSIS — I10 ESSENTIAL HYPERTENSION: ICD-10-CM

## 2025-02-06 DIAGNOSIS — R92.8 ABNORMAL MAMMOGRAM: ICD-10-CM

## 2025-02-06 DIAGNOSIS — J41.1 CHRONIC BRONCHITIS WITH PRODUCTIVE MUCOPURULENT COUGH: ICD-10-CM

## 2025-02-06 PROCEDURE — 99396 PREV VISIT EST AGE 40-64: CPT | Mod: S$GLB,,, | Performed by: FAMILY MEDICINE

## 2025-02-06 RX ORDER — TIRZEPATIDE 12.5 MG/.5ML
12.5 INJECTION, SOLUTION SUBCUTANEOUS
Qty: 6 ML | Refills: 3 | Status: SHIPPED | OUTPATIENT
Start: 2025-02-06

## 2025-02-06 RX ORDER — VALSARTAN 160 MG/1
160 TABLET ORAL DAILY
Qty: 90 TABLET | Refills: 3 | Status: SHIPPED | OUTPATIENT
Start: 2025-02-06 | End: 2026-02-06

## 2025-02-06 NOTE — PROGRESS NOTES
"    SUBJECTIVE:   HPI: Nohelia Turk  is a 54 y.o. female who presents for annual physical .   6M Check Up / Pap (-HIV Screening Due/-Tdap / Pneumonia / Shingles / Covid Vaccinations Due/-Flu Vaccine Declined), Discuss Blood Pressure, and Discuss Mounjaro    History of Present Illness    CHIEF COMPLAINT:  Patient presents today for annual visit and follow up of hypertension    HYPERTENSION:  Home blood pressure readings demonstrate systolic pressures of 130-135 and diastolic pressures consistently in the 90s to over 100. She takes blood pressure medication nightly. She has history of adverse reaction to amlodipine with ankle swelling.    WEIGHT MANAGEMENT:  She reports weight loss from 201 lbs to 175 lbs since July, with the lowest weight reached being 168 lbs. She expresses a desire to reach a goal weight of 150 lbs but reports feeling "stuck" at her current weight. She is currently taking Zepbound 10 mg for weight loss.    MENTAL HEALTH:  She reports improved mood following her spouse's death one year ago. She is no longer feeling sad, has started dating, and is getting back into life activities such as working on her house. She continues venlafaxine 75 mg for depression.    SLEEP:  She uses CPAP mask nightly with good compliance and reports significant improvement in sleep quality. She occasionally removes mask during sleep unknowingly, resulting in sore throat from snoring. She takes Clonidine as needed, typically half a dose, for important nights when she needs particularly good rest.    BREAST HEALTH:  Recent mammogram detected a small abnormality. Follow-up ultrasound did not reveal any significant findings. She is scheduled for a 6-month follow-up mammogram to reassess the area of concern.    SMOKING STATUS:  She quit smoking 6 days ago and continues Chantix for smoking cessation.    MEDICATIONS:  Current medications include Veozah for hot flashes.         (Not in a hospital admission)    Review of " patient's allergies indicates:  No Known Allergies  Current Outpatient Medications on File Prior to Visit   Medication Sig Dispense Refill    calcium-vitamin D3 (OS-ALDO 500 + D3) 500 mg-5 mcg (200 unit) per tablet Take 1 tablet by mouth 2 (two) times daily with meals.      cloNIDine (CATAPRES) 0.1 MG tablet Take 1 tablet (0.1 mg total) by mouth every evening. 90 tablet 3    fezolinetant (VEOZAH) 45 mg Tab Take 45 mg by mouth Daily. 90 tablet 3    magnesium carb,citrate,oxide (MAGNESIUM COMPLEX ORAL) Take 1 tablet by mouth Daily.      nebivoloL (BYSTOLIC) 10 MG Tab Take 1 tablet (10 mg total) by mouth once daily. 90 tablet 3    prasterone, DHEA, (DHEA ORAL) Take by mouth.      varenicline (CHANTIX) 1 mg Tab Take 1 tablet (1 mg total) by mouth 2 (two) times daily. 180 tablet 1    [DISCONTINUED] tirzepatide, weight loss, (ZEPBOUND) 10 mg/0.5 mL PnIj Inject 10 mg into the skin every 7 days. 6 mL 2    [DISCONTINUED] venlafaxine (EFFEXOR-XR) 75 MG 24 hr capsule Take 1 capsule (75 mg total) by mouth once daily. 90 capsule 3     No current facility-administered medications on file prior to visit.     Past Medical History:   Diagnosis Date    Essential hypertension 2022     Past Surgical History:   Procedure Laterality Date    HIP ARTHROPLASTY Left 2023    Procedure: ARTHROPLASTY, HIP;  Surgeon: Carlos Alberto Bejarano II, MD;  Location: [facility];  Service: Orthopedics;  Laterality: Left;     Family History   Problem Relation Name Age of Onset    Hypertension Mother      Heart disease Mother  74        CHF    Osteoporosis Mother      Cancer Father  73        colon & prostate    Diabetes Father      Hypertension Father       Social History     Tobacco Use    Smoking status: Former     Current packs/day: 0.00     Average packs/day: 1 pack/day for 38.1 years (38.1 ttl pk-yrs)     Types: Cigarettes     Start date:      Quit date: 2025     Years since quittin.0    Smokeless tobacco: Never   Substance Use Topics  "   Alcohol use: Yes     Comment: periodically at dinner    Drug use: Never      Health Maintenance Topics with due status: Not Due       Topic Last Completion Date    Colorectal Cancer Screening 05/06/2019    Cervical Cancer Screening 01/31/2022    Lipid Panel 03/12/2024    Hemoglobin A1c (Diabetic Prevention Screening) 03/14/2024    Mammogram 07/22/2024    LDCT Lung Screen 08/03/2024    RSV Vaccine (Age 60+ and Pregnant patients) Not Due     Immunization History   Administered Date(s) Administered    COVID-19, vector-nr, rS-Ad26, PF ([employer]) 04/05/2021       Review of Systems   Respiratory:  Negative for shortness of breath.    Cardiovascular:  Negative for chest pain and palpitations.   Musculoskeletal:  Negative for neck pain.   Neurological:  Negative for headaches.      OBJECTIVE:          7/31/2024     8:55 AM 2/6/2025     8:49 AM   Vitals - 1 value per visit   SYSTOLIC 122 152   DIASTOLIC 88 104   Pulse 84 70   SPO2  99 %   Weight (lb) 201 175   Weight (kg) 91.173 79.379   Height 5' 7" (1.702 m) 5' 7" (1.702 m)   BMI (Calculated) 31.5 27.4      Physical Exam  Constitutional:       Appearance: Normal appearance.   HENT:      Head: Normocephalic and atraumatic.      Mouth/Throat:      Mouth: Mucous membranes are moist.   Eyes:      Conjunctiva/sclera: Conjunctivae normal.   Cardiovascular:      Rate and Rhythm: Normal rate and regular rhythm.   Pulmonary:      Effort: Pulmonary effort is normal.      Breath sounds: Normal breath sounds.   Genitourinary:     Vagina: Normal.      Cervix: Normal.      Uterus: Normal.       Adnexa: Right adnexa normal and left adnexa normal.   Musculoskeletal:         General: Normal range of motion.   Neurological:      General: No focal deficit present.      Mental Status: She is alert and oriented to person, place, and time.   Psychiatric:         Mood and Affect: Mood normal.         Behavior: Behavior normal.          Assessment:       Assessment & Plan    IMPRESSION:  - " Added low-dose Valsartan to existing Bistolic regimen for hypertension management, aiming to achieve better blood pressure control without further reducing heart rate  - Increased Zepbound dosage from 10mg to 12.5mg to support continued weight loss efforts  - Recommend weaning off venlafaxine due to improvement in situational depression  - Noted improvement in pre-diabetes likely due to significant weight loss  - Reviewed recent abnormal mammogram results, patient scheduled for follow-up imaging    SEVERE OBESITY (BMI 35.0-39.9) WITH COMORBIDITY:  - Increased Zepbound dosage to 12.5mg for weight management.  - Patient's weight decreased from 201 lbs in July to current 175 lbs, resulting in a 25-pound weight loss.  - Although patient reached a low of 168 lbs, weight has plateaued at 175 lbs, now placing patient in the overweight category rather than obese.  - Discussed maintenance plan for when goal weight of 150 lbs is reached, involving maintaining current dose and gradually reducing it.    CHRONIC BRONCHITIS WITH PRODUCTIVE MUCOPURULENT COUGH:  - Patient stopped smoking as of January 31st, 6 days prior to visit.  - Previous blood count showed high values consistent with smoking usage.  - Continued Chantix for smoking cessation support and encouraged patient to maintain cessation efforts.    HYPERTENSION:  - Observed high blood pressure during the visit, necessitating medication adjustments.  - Explained rationale for adding a second blood pressure medication at low dose to maximize benefits while minimizing side effects.  - Initiated Valsartan 160mg daily, to be taken at bedtime along with current Bistolic 10mg daily.  - Noted patient's home blood pressure readings with systolic around 130-135 and diastolic consistently in the 90s or over 100.  - Confirmed patient is taking blood pressure medication at night without reported problems.    HOT FLASHES:  - Continued Veozah for management of hot flashes.  - Noted  positive patient response to treatment.    DEPRESSION:  - Discussed importance of gradual discontinuation of venlafaxine to minimize withdrawal effects.  - Initiated 3-week discontinuation plan: take 75mg every other day for 1 week, then every 3 days for 1 week, then stop completely.  - Noted patient's improvement in mood, feeling better, and resuming dating.  - Acknowledged situational nature of depression and agreed with patient's desire to discontinue medication.    SLEEP APNEA:  - Advised patient to maintain CPAP usage for sleep apnea management.  - Noted patient's nightly CPAP mask usage, improved sleep quality, and sore throat when not using it due to snoring.  - Continued Clonidine as needed for sleep, typically at half dose.    BREAST HEALTH:  - Noted July mammogram was fine, but a small bump required follow-up.  - Reviewed ultrasound results showing no significant findings.  - Recommend and scheduled 6-month follow-up mammogram of left breast.    COLON CANCER SCREENING:  - Recommend scheduling follow-up colonoscopy due to history of polyps found in 2019 screening.    HYPERLIPIDEMIA:  - Ordered fasting lipid panel due to borderline cholesterol in previous year's test.    PREDIABETES:  - Ordered Hemoglobin A1C test due to slightly elevated previous A1C.  - Observed that weight loss has likely improved pre-diabetes condition.    WEIGHT MANAGEMENT:  - Noted patient's use of Zepbound (tirzepatide) for weight loss, which also has hypoglycemic effects.    FOLLOW UP:  - Scheduled follow-up for next annual well-woman visit and Pap sm  ear.           Plan:       Well woman exam with routine gynecological exam  -     ThinPrep Img Pap/HPV mRNA E6/E7 Ch, Gono  -     Hemoglobin A1C; Future; Expected date: 02/06/2025  -     Lipid Panel; Future; Expected date: 02/06/2025  -     Comprehensive Metabolic Panel; Future; Expected date: 02/06/2025  -     CBC Auto Differential; Future; Expected date: 02/06/2025    Essential  hypertension  -     tirzepatide, weight loss, (ZEPBOUND) 12.5 mg/0.5 mL PnIj; Inject 12.5 mg into the skin every 7 days.  Dispense: 6 mL; Refill: 3  -     valsartan (DIOVAN) 160 MG tablet; Take 1 tablet (160 mg total) by mouth once daily. For blood pressure  Dispense: 90 tablet; Refill: 3  -     Lipid Panel; Future; Expected date: 02/06/2025  -     Comprehensive Metabolic Panel; Future; Expected date: 02/06/2025  -     CBC Auto Differential; Future; Expected date: 02/06/2025    Situational depression    Abnormal mammogram    Cigarette nicotine dependence with other nicotine-induced disorder    Perimenopausal symptom    RADHA on CPAP    Prediabetes  -     Hemoglobin A1C; Future; Expected date: 02/06/2025    Severe obesity (BMI 35.0-39.9) with comorbidity  -     tirzepatide, weight loss, (ZEPBOUND) 12.5 mg/0.5 mL PnIj; Inject 12.5 mg into the skin every 7 days.  Dispense: 6 mL; Refill: 3    Chronic bronchitis with productive mucopurulent cough        Counseled on age and gender appropriate medical preventative services, including cancer screenings, immunizations, overall nutritional health, need for a consistent exercise regimen and an overall push towards maintaining a vigorous and active lifestyle.      Follow up in about 6 months (around 8/6/2025) for HTN.        This note was generated with the assistance of ambient listening technology. Verbal consent was obtained by the patient and accompanying visitor(s) for the recording of patient appointment to facilitate this note. I attest to having reviewed and edited the generated note for accuracy, though some syntax or spelling errors may persist. Please contact the author of this note for any clarification.

## 2025-02-07 ENCOUNTER — HOSPITAL ENCOUNTER (OUTPATIENT)
Dept: RADIOLOGY | Facility: HOSPITAL | Age: 55
Discharge: HOME | End: 2025-02-07
Attending: FAMILY MEDICINE
Payer: COMMERCIAL

## 2025-02-07 VITALS — BODY MASS INDEX: 27.47 KG/M2 | HEIGHT: 67 IN | WEIGHT: 175 LBS

## 2025-02-07 DIAGNOSIS — R92.8 ABNORMAL MAMMOGRAM: ICD-10-CM

## 2025-02-07 PROCEDURE — 77065 DX MAMMO INCL CAD UNI: CPT | Mod: 26,LT,, | Performed by: RADIOLOGY

## 2025-02-07 PROCEDURE — 77065 DX MAMMO INCL CAD UNI: CPT | Mod: TC,PO,LT

## 2025-02-07 PROCEDURE — 77061 BREAST TOMOSYNTHESIS UNI: CPT | Mod: 26,LT,, | Performed by: RADIOLOGY

## 2025-02-07 PROCEDURE — 76642 ULTRASOUND BREAST LIMITED: CPT | Mod: 26,LT,, | Performed by: RADIOLOGY

## 2025-02-07 PROCEDURE — 76642 ULTRASOUND BREAST LIMITED: CPT | Mod: TC,PO,LT

## 2025-02-11 LAB
C TRACH RRNA SPEC QL NAA+PROBE: NOT DETECTED
CLINICAL INFO: NORMAL
COMMENT: NORMAL
COMMENT: NORMAL
CYTO CVX: NORMAL
CYTOLOGIST CVX/VAG CYTO: NORMAL
CYTOLOGY CMNT CVX/VAG CYTO-IMP: NORMAL
DATE OF PREVIOUS PAP: NORMAL
DATE PREVIOUS BX: NORMAL
HPV E6+E7 MRNA CVX QL NAA+PROBE: NOT DETECTED
LMP START DATE: NORMAL
N GONORRHOEA RRNA SPEC QL NAA+PROBE: NOT DETECTED
SPECIMEN SOURCE CVX/VAG CYTO: NORMAL
STAT OF ADQ CVX/VAG CYTO-IMP: NORMAL

## 2025-03-10 ENCOUNTER — TELEPHONE (OUTPATIENT)
Dept: FAMILY MEDICINE | Facility: CLINIC | Age: 55
End: 2025-03-10
Payer: COMMERCIAL

## 2025-03-10 ENCOUNTER — PATIENT MESSAGE (OUTPATIENT)
Dept: ADMINISTRATIVE | Facility: HOSPITAL | Age: 55
End: 2025-03-10
Payer: COMMERCIAL

## 2025-03-18 ENCOUNTER — PATIENT MESSAGE (OUTPATIENT)
Dept: ADMINISTRATIVE | Facility: HOSPITAL | Age: 55
End: 2025-03-18
Payer: COMMERCIAL

## 2025-03-21 ENCOUNTER — PATIENT OUTREACH (OUTPATIENT)
Dept: FAMILY MEDICINE | Facility: CLINIC | Age: 55
End: 2025-03-21
Payer: COMMERCIAL

## 2025-03-21 ENCOUNTER — TELEPHONE (OUTPATIENT)
Dept: FAMILY MEDICINE | Facility: CLINIC | Age: 55
End: 2025-03-21
Payer: COMMERCIAL

## 2025-03-21 VITALS — SYSTOLIC BLOOD PRESSURE: 156 MMHG | DIASTOLIC BLOOD PRESSURE: 99 MMHG

## 2025-03-21 NOTE — TELEPHONE ENCOUNTER
"----- Message from Nurse Trish sent at 3/21/2025  9:20 AM CDT -----  Regarding: Elevated Blood Pressure Reading  Good morning. I did receive a message from a patient about her blood pressure readings. Please see the message below:"I take my meds each night before I go to bed at around 10pm. My pressure has still been a little high running 156/99, 139/93, 137/91. My pulse has been 79, 86, 88. I need to do better with taking my pressure at different times of the day."Thanks,Trish Banegas Clinical Care CoordinatorOchsner Saint [facility] HospitalPhone: [phone]: [phone]  "

## 2025-03-21 NOTE — PROGRESS NOTES
Population Health Chart Review & Patient Outreach Details      Additional [facility] Notes:      The patient did send in a remote blood pressure reading that was elevated 156/99 . CA 3/21/25         Updates Requested / Reviewed:      Updated Care Coordination Note, Care Everywhere, and Immunizations Reconciliation Completed or Queried: [facility] Topics Overdue:      [facility] Score: 2     Uncontrolled BP  Hemoglobin A1c                       Health Maintenance Topic(s) Outreach Outcomes & Actions Taken:    Blood Pressure - Outreach Outcomes & Actions Taken  : Remote Blood Pressure Reading Captured

## 2025-03-28 ENCOUNTER — PATIENT MESSAGE (OUTPATIENT)
Dept: FAMILY MEDICINE | Facility: CLINIC | Age: 55
End: 2025-03-28
Payer: COMMERCIAL

## 2025-05-28 LAB — CRC RECOMMENDATION EXT: NORMAL

## 2025-06-03 ENCOUNTER — TELEPHONE (OUTPATIENT)
Dept: FAMILY MEDICINE | Facility: CLINIC | Age: 55
End: 2025-06-03
Payer: COMMERCIAL

## 2025-06-03 VITALS — SYSTOLIC BLOOD PRESSURE: 120 MMHG | DIASTOLIC BLOOD PRESSURE: 80 MMHG

## 2025-06-03 DIAGNOSIS — I10 ESSENTIAL HYPERTENSION: Primary | ICD-10-CM

## 2025-06-03 RX ORDER — NEBIVOLOL 10 MG/1
10 TABLET ORAL DAILY
Qty: 90 TABLET | Refills: 3 | Status: SHIPPED | OUTPATIENT
Start: 2025-06-03

## 2025-06-20 ENCOUNTER — PATIENT OUTREACH (OUTPATIENT)
Dept: ADMINISTRATIVE | Facility: HOSPITAL | Age: 55
End: 2025-06-20
Payer: COMMERCIAL

## 2025-07-10 ENCOUNTER — OFFICE VISIT (OUTPATIENT)
Dept: FAMILY MEDICINE | Facility: CLINIC | Age: 55
End: 2025-07-10
Payer: COMMERCIAL

## 2025-07-10 VITALS
DIASTOLIC BLOOD PRESSURE: 86 MMHG | SYSTOLIC BLOOD PRESSURE: 134 MMHG | HEIGHT: 67 IN | HEART RATE: 83 BPM | BODY MASS INDEX: 26.06 KG/M2 | WEIGHT: 166 LBS

## 2025-07-10 DIAGNOSIS — Z86.0100 HISTORY OF COLON POLYPS: ICD-10-CM

## 2025-07-10 DIAGNOSIS — I10 ESSENTIAL HYPERTENSION: Primary | ICD-10-CM

## 2025-07-10 DIAGNOSIS — M25.50 ARTHRALGIA, UNSPECIFIED JOINT: ICD-10-CM

## 2025-07-10 DIAGNOSIS — F43.21 SITUATIONAL DEPRESSION: ICD-10-CM

## 2025-07-10 DIAGNOSIS — N95.1 MENOPAUSAL SYMPTOMS: ICD-10-CM

## 2025-07-10 DIAGNOSIS — Z80.0 FAMILY HISTORY OF COLON CANCER IN FATHER: ICD-10-CM

## 2025-07-10 DIAGNOSIS — E66.01 SEVERE OBESITY (BMI 35.0-39.9) WITH COMORBIDITY: ICD-10-CM

## 2025-07-10 DIAGNOSIS — F17.218 CIGARETTE NICOTINE DEPENDENCE WITH OTHER NICOTINE-INDUCED DISORDER: ICD-10-CM

## 2025-07-10 DIAGNOSIS — G47.33 OSA ON CPAP: ICD-10-CM

## 2025-07-10 PROCEDURE — 99214 OFFICE O/P EST MOD 30 MIN: CPT | Mod: S$GLB,,, | Performed by: FAMILY MEDICINE

## 2025-07-10 RX ORDER — TIRZEPATIDE 15 MG/.5ML
15 INJECTION, SOLUTION SUBCUTANEOUS
Qty: 6 ML | Refills: 3 | Status: SHIPPED | OUTPATIENT
Start: 2025-07-10

## 2025-07-10 RX ORDER — VENLAFAXINE HYDROCHLORIDE 75 MG/1
75 CAPSULE, EXTENDED RELEASE ORAL DAILY
COMMUNITY
Start: 2025-04-25

## 2025-07-10 NOTE — PROGRESS NOTES
SUBJECTIVE:    Patient ID: Nohelia Turk is a 55 y.o. female.    Chief Complaint: Discuss Zepbound Increase, C/O Joint Pains, Decreasing Effexor, and Discuss HTN    History of Present Illness    CHIEF COMPLAINT:  Patient presents today with joint pain at night.    MUSCULOSKELETAL:  She reports experiencing joint pain in multiple joints including knees, elbows, hips, and shoulders for a few weeks. Pain is nocturnal, waking her from sleep and characterized as an ache that subsides with Tylenol. She denies swollen joints or pain during active periods. Family history notable for osteoarthritis in her mother. She is uncertain if joint pain is related to aging, menopause, or potential side effect from Valsartan. Pain occurs nightly and is brief in duration without significant functional impairment.    WEIGHT MANAGEMENT:  She reports successful weight loss after smoking cessation and is currently on ZepBound 12.5 mg. She denies any side effects from the medication and is approaching her goal weight of 150 lbs. She desires to increase to the next dose of 15 mg.    CURRENT MEDICATIONS:  She takes:  - Valsartan for blood pressure  - ZepBound 12.5 mg for weight management  - Veozah for menopause symptoms  - Weaning off Effexor, taking one pill every two days  - Chantix daily for nicotine addiction, planning to wean off next month  - Clonidine PRN for sleep (half tablet)  - Magnesium nightly for bowel regularity  - Tylenol PRN for joint pain    RECENT STUDIES:  Colonoscopy was performed one month ago. Mammogram showed unchanged small breast spot. March labs showed A1C of 5.8, normal blood count and metabolic panel. Pap smear completed.         Past Medical History:   Diagnosis Date    Essential hypertension 11/21/2022     Past Surgical History:   Procedure Laterality Date    colonoscopy  05/28/2025    Repeat in 5 years    HIP ARTHROPLASTY Left 12/20/2023    Procedure: ARTHROPLASTY, HIP;  Surgeon: Carlos Alberto Bejarano II, MD;  " Location: [facility];  Service: Orthopedics;  Laterality: Left;     Family History   Problem Relation Name Age of Onset    Hypertension Mother      Heart disease Mother  74        CHF    Osteoporosis Mother      Cancer Father  73        colon & prostate    Diabetes Father      Hypertension Father         Marital Status:   Alcohol History:  reports current alcohol use.  Tobacco History:  reports that she quit smoking about 5 months ago. Her smoking use included cigarettes. She started smoking about 42 years ago. She has a 38.1 pack-year smoking history. She has never used smokeless tobacco.  Drug History:  reports no history of drug use.    Review of patient's allergies indicates:  No Known Allergies  Current Medications[1]    Review of Systems   Constitutional:  Negative for activity change, fatigue and unexpected weight change.   HENT:  Negative for hearing loss, postnasal drip, sinus pressure, sore throat and voice change.    Eyes:  Negative for photophobia and visual disturbance.   Respiratory:  Negative for cough, shortness of breath and wheezing.    Cardiovascular:  Negative for chest pain and palpitations.   Gastrointestinal:  Negative for constipation, diarrhea and nausea.   Genitourinary:  Negative for difficulty urinating, frequency, hematuria and urgency.   Musculoskeletal:  Positive for arthralgias. Negative for back pain.   Skin:  Negative for rash.   Neurological:  Negative for weakness, light-headedness and headaches.   Hematological:  Negative for adenopathy. Does not bruise/bleed easily.   Psychiatric/Behavioral:  The patient is not nervous/anxious.           Objective:          6/3/2025     4:49 PM 7/10/2025     8:15 AM   Vitals - 1 value per visit   SYSTOLIC 120 134   DIASTOLIC 80 86   Pulse  83   Weight (lb)  166   Weight (kg)  75.297   Height  5' 7" (1.702 m)   BMI (Calculated)  26      Physical Exam  Constitutional:       Appearance: Normal appearance.   HENT:      Head: Normocephalic and " atraumatic.      Mouth/Throat:      Mouth: Mucous membranes are moist.   Eyes:      Conjunctiva/sclera: Conjunctivae normal.   Pulmonary:      Effort: Pulmonary effort is normal.   Neurological:      General: No focal deficit present.      Mental Status: She is alert and oriented to person, place, and time.   Psychiatric:         Mood and Affect: Mood normal.         Behavior: Behavior normal.             Assessment:     ## JOINT PAIN:  - Assessed patient's joint pain in knees, elbows, and hips, which occurs at night and is not associated with swollen joints or activity.  - Pain subsides after taking Tylenol.  - Considered joint pain as potential side effect of recently added Valsartan, though uncommon, potentially worsening arthritis in older females.  - Evaluated arthralgia in context of possible osteoarthritis and menopause.  - Advised patient to take Tylenol before bedtime and perform stretching exercises to manage symptoms.    ## OBESITY MANAGEMENT:  - Monitored patient's progress on ZepBound 12.5 mg, noting good weight loss results with current weight approaching goal of 150 lbs.  - Assessed effectiveness of GLP-1 medications for weight management.  - Increased ZepBound from 12.5 mg to 15 mg (maximum dose) for continued obesity management.  - Advised patient to maintain current weight loss efforts.    ## HYPERTENSION MANAGEMENT:  - Monitored blood pressure, which is now in normal range after Valsartan became effective.  - Continued Valsartan and biologic for blood pressure management due to effective control.    ## MENOPAUSE MANAGEMENT:  - Monitored menopause symptoms, noting patient is currently on Veozah for management.  - Hot flashes return if naproxen is not taken regularly.  - Continued Veozah for ongoing menopause symptom management.    ## DEPRESSION MANAGEMENT:  - Monitored patient's progress in weaning off venlafaxine, currently taking it every 2 days.  - Decreased venlafaxine (Effexor) to twice weekly  as part of the continued weaning process.    ## NICOTINE DEPENDENCE MANAGEMENT:  - Monitored patient's daily use of Chantix for nicotine dependence.  - Continued Chantix for smoking cessation, with plan to start weaning off at beginning of next month by taking it every other day.    ## INSOMNIA MANAGEMENT:  - Monitored patient's occasional use of Clonidine for sleep difficulties due to racing thoughts.  - Advised to continue as-needed use for sleep issues.    ## SLEEP APNEA MANAGEMENT:  - Evaluated patient's management of sleep apnea with CPAP.  - Advised to continue using CPAP for ongoing management.    ## PREDIABETES MANAGEMENT:  - Monitored patient's A1C level, which was 5.8 in March, indicating prediabetes.  - Discussed implications of this diagnosis.    ## COLON POLYPS AND CANCER SCREENING:  - Reviewed patient's colonoscopy history, noting procedures in spring and 1 month ago with no new polyps found.  - Recommend continuing five-year colonoscopy follow-up schedule due to history of polyps and family history of colon cancer.    ## IMMUNIZATIONS:  - Administered tetanus vaccine today.  - Also administered first dose of shingles vaccine (2-shot series).  - Discussed importance of shingles vaccination, even without prior chickenpox infection, noting 80% effectiveness after 1 shot and over 90% after 2 shots.    ## FOLLOW-UP:  - Follow up in February for annual visit.  - Contact the office if running low on medication refills before next appointment.            Plan:       Essential hypertension  -     tirzepatide, weight loss, (ZEPBOUND) 15 mg/0.5 mL PnIj; Inject 15 mg into the skin every 7 days.  Dispense: 6 mL; Refill: 3    Severe obesity (BMI 35.0-39.9) with comorbidity  -     tirzepatide, weight loss, (ZEPBOUND) 15 mg/0.5 mL PnIj; Inject 15 mg into the skin every 7 days.  Dispense: 6 mL; Refill: 3    RADHA on CPAP  -     tirzepatide, weight loss, (ZEPBOUND) 15 mg/0.5 mL PnIj; Inject 15 mg into the skin every 7  days.  Dispense: 6 mL; Refill: 3    Menopausal symptoms    Arthralgia, unspecified joint    Cigarette nicotine dependence with other nicotine-induced disorder    Situational depression    History of colon polyps    Family history of colon cancer in father      Medication List with Changes/Refills   New Medications    TIRZEPATIDE, WEIGHT LOSS, (ZEPBOUND) 15 MG/0.5 ML PNIJ    Inject 15 mg into the skin every 7 days.   Current Medications    CLONIDINE (CATAPRES) 0.1 MG TABLET    Take 1 tablet (0.1 mg total) by mouth every evening.    FEZOLINETANT (VEOZAH) 45 MG TAB    Take 45 mg by mouth Daily.    MAGNESIUM CARB,CITRATE,OXIDE (MAGNESIUM COMPLEX ORAL)    Take 1 tablet by mouth Daily.    NEBIVOLOL (BYSTOLIC) 10 MG TAB    Take 1 tablet (10 mg total) by mouth once daily.    PRASTERONE, DHEA, (DHEA ORAL)    Take by mouth.    VALSARTAN (DIOVAN) 160 MG TABLET    Take 1 tablet (160 mg total) by mouth once daily. For blood pressure    VARENICLINE (CHANTIX) 1 MG TAB    Take 1 tablet (1 mg total) by mouth 2 (two) times daily.    VENLAFAXINE (EFFEXOR-XR) 75 MG 24 HR CAPSULE    Take 75 mg by mouth once daily.   Discontinued Medications    CALCIUM-VITAMIN D3 (OS-ALDO 500 + D3) 500 MG-5 MCG (200 UNIT) PER TABLET    Take 1 tablet by mouth 2 (two) times daily with meals.    TIRZEPATIDE, WEIGHT LOSS, (ZEPBOUND) 12.5 MG/0.5 ML PNIJ    Inject 12.5 mg into the skin every 7 days.      Follow up in about 7 months (around 2/10/2026) for Annual Physical.      Stable on medications continue current    This note was generated with the assistance of ambient listening technology. Verbal consent was obtained by the patient and accompanying visitor(s) for the recording of patient appointment to facilitate this note. I attest to having reviewed and edited the generated note for accuracy, though some syntax or spelling errors may persist. Please contact the author of this note for any clarification.               [1]   Current Outpatient Medications:      cloNIDine (CATAPRES) 0.1 MG tablet, Take 1 tablet (0.1 mg total) by mouth every evening., Disp: 90 tablet, Rfl: 3    fezolinetant (VEOZAH) 45 mg Tab, Take 45 mg by mouth Daily., Disp: 90 tablet, Rfl: 3    magnesium carb,citrate,oxide (MAGNESIUM COMPLEX ORAL), Take 1 tablet by mouth Daily., Disp: , Rfl:     nebivoloL (BYSTOLIC) 10 MG Tab, Take 1 tablet (10 mg total) by mouth once daily., Disp: 90 tablet, Rfl: 3    prasterone, DHEA, (DHEA ORAL), Take by mouth., Disp: , Rfl:     valsartan (DIOVAN) 160 MG tablet, Take 1 tablet (160 mg total) by mouth once daily. For blood pressure, Disp: 90 tablet, Rfl: 3    varenicline (CHANTIX) 1 mg Tab, Take 1 tablet (1 mg total) by mouth 2 (two) times daily., Disp: 180 tablet, Rfl: 1    venlafaxine (EFFEXOR-XR) 75 MG 24 hr capsule, Take 75 mg by mouth once daily., Disp: , Rfl:     tirzepatide, weight loss, (ZEPBOUND) 15 mg/0.5 mL PnIj, Inject 15 mg into the skin every 7 days., Disp: 6 mL, Rfl: 3

## 2025-09-04 ENCOUNTER — OFFICE VISIT (OUTPATIENT)
Dept: ORTHOPEDICS | Facility: CLINIC | Age: 55
End: 2025-09-04
Payer: COMMERCIAL

## 2025-09-04 DIAGNOSIS — M65.331 TRIGGER FINGER, RIGHT MIDDLE FINGER: Primary | ICD-10-CM

## 2025-09-04 PROCEDURE — 99999 PR PBB SHADOW E&M-EST. PATIENT-LVL II: CPT | Mod: PBBFAC,,, | Performed by: ORTHOPAEDIC SURGERY

## 2025-09-04 RX ORDER — TRIAMCINOLONE ACETONIDE 40 MG/ML
40 INJECTION, SUSPENSION INTRA-ARTICULAR; INTRAMUSCULAR
Status: DISCONTINUED | OUTPATIENT
Start: 2025-09-04 | End: 2025-09-04 | Stop reason: HOSPADM

## 2025-09-04 RX ADMIN — TRIAMCINOLONE ACETONIDE 40 MG: 40 INJECTION, SUSPENSION INTRA-ARTICULAR; INTRAMUSCULAR at 04:09

## (undated) DEVICE — SOL NACL IRR 3000ML

## (undated) DEVICE — GLOVE SENSICARE PI ALOE 6

## (undated) DEVICE — SLEEVE SCD EXPRESS KNEE MEDIUM

## (undated) DEVICE — DRAPE THREE-QTR REINF 53X77IN

## (undated) DEVICE — DRAPE INCISE IOBAN 2 23X33IN

## (undated) DEVICE — BNDG COFLEX FOAM LF2 ST 6X5YD

## (undated) DEVICE — SYS CLSR DERMABOND PRINEO 22CM

## (undated) DEVICE — DRAPE INCISE IOBAN 2 23X17IN

## (undated) DEVICE — GLOVE SENSICARE PI GRN 7

## (undated) DEVICE — GLOVE SENSICARE PI ALOE 8.5

## (undated) DEVICE — SOL POVIDONE PREP IODINE 4 OZ

## (undated) DEVICE — COVER PROXIMA MAYO STAND

## (undated) DEVICE — DRAPE INVISISHIELD TOWEL SMALL

## (undated) DEVICE — COVER TABLE 44X90 STERILE

## (undated) DEVICE — SOCKINETTE IMPERVIOUS 12X48IN

## (undated) DEVICE — INTERPULSE SET

## (undated) DEVICE — SOL NACL IRR 1000ML BTL

## (undated) DEVICE — GLOVE SENSICARE PI GRN 8.5

## (undated) DEVICE — BLADE SAW SGTTL 4.72X25.4X1.27

## (undated) DEVICE — SUT CTD VICRYL 0 UND BR

## (undated) DEVICE — BLADE SURG CARBON STEEL #10

## (undated) DEVICE — TOWEL OR DISP STRL BLUE 4/PK

## (undated) DEVICE — NDL MAYO 2

## (undated) DEVICE — PACK CUSTOM UNIV BASIN SLI

## (undated) DEVICE — TOGA FLYTE PEEL AWAY XLARGE

## (undated) DEVICE — APPLICATOR CHLORAPREP ORN 26ML

## (undated) DEVICE — DRAPE ORTH SPLIT 77X108IN

## (undated) DEVICE — SPONGE LAP 18X18 PREWASHED

## (undated) DEVICE — ALCOHOL 70% ANTISEPTIC ISO 4OZ

## (undated) DEVICE — GLOVE SENSICARE PI ALOE 8

## (undated) DEVICE — YANKAUER FLEX NO VENT HI CAP

## (undated) DEVICE — GLOVE SENSICARE PI GRN 6

## (undated) DEVICE — SPONGE BULKEE II ABSRB 6X6.75

## (undated) DEVICE — SUT ETHIBOND XTRA 5 V-37 GR

## (undated) DEVICE — PACK SIRUS BASIC V SURG STRL

## (undated) DEVICE — SUT MONOCRYL 3-0 PS-1

## (undated) DEVICE — DRAPE HIP PCH 112X137X89IN

## (undated) DEVICE — GLOVE SENSICARE PI ALOE 7

## (undated) DEVICE — ELECTRODE BLADE TEFLON 6

## (undated) DEVICE — MANIFOLD 4 PORT

## (undated) DEVICE — GLOVE SENSICARE PI ALOE 6.5

## (undated) DEVICE — LINER SUCTION 3000CC

## (undated) DEVICE — DRAPE STERI U-SHAPED 47X51IN

## (undated) DEVICE — SUT 2/0 18IN COATED VICRYL

## (undated) DEVICE — DRAPE U SPLIT SHEET 54X76IN

## (undated) DEVICE — GOWN TOGA SYS PEELWY ZIP 2 XL

## (undated) DEVICE — Device

## (undated) DEVICE — WRAP SHLDR HIP ACCU THRM PACK

## (undated) DEVICE — SUT STRATAFIX PDS 1 CTX 18IN

## (undated) DEVICE — ELECTRODE REM PLYHSV RETURN 9